# Patient Record
Sex: FEMALE | Race: AMERICAN INDIAN OR ALASKA NATIVE
[De-identification: names, ages, dates, MRNs, and addresses within clinical notes are randomized per-mention and may not be internally consistent; named-entity substitution may affect disease eponyms.]

---

## 2017-07-04 ENCOUNTER — HOSPITAL ENCOUNTER (EMERGENCY)
Dept: HOSPITAL 5 - ED | Age: 42
LOS: 1 days | Discharge: HOME | End: 2017-07-05
Payer: MEDICARE

## 2017-07-04 DIAGNOSIS — K21.9: ICD-10-CM

## 2017-07-04 DIAGNOSIS — F14.10: ICD-10-CM

## 2017-07-04 DIAGNOSIS — Z89.512: ICD-10-CM

## 2017-07-04 DIAGNOSIS — I10: ICD-10-CM

## 2017-07-04 DIAGNOSIS — I82.402: ICD-10-CM

## 2017-07-04 DIAGNOSIS — F17.210: ICD-10-CM

## 2017-07-04 DIAGNOSIS — E11.9: ICD-10-CM

## 2017-07-04 DIAGNOSIS — R07.89: Primary | ICD-10-CM

## 2017-07-04 DIAGNOSIS — M19.90: ICD-10-CM

## 2017-07-04 DIAGNOSIS — E78.00: ICD-10-CM

## 2017-07-04 DIAGNOSIS — E87.6: ICD-10-CM

## 2017-07-04 DIAGNOSIS — Z79.4: ICD-10-CM

## 2017-07-04 DIAGNOSIS — D64.9: ICD-10-CM

## 2017-07-04 LAB
ALBUMIN SERPL-MCNC: 4.1 G/DL (ref 3.9–5)
ALBUMIN/GLOB SERPL: 1.2 %
ALP SERPL-CCNC: 84 UNITS/L (ref 35–129)
ALT SERPL-CCNC: 10 UNITS/L (ref 7–56)
ANION GAP SERPL CALC-SCNC: 21 MMOL/L
BASOPHILS NFR BLD AUTO: 0.9 % (ref 0–1.8)
BILIRUB SERPL-MCNC: < 0.2 MG/DL (ref 0.1–1.2)
BILIRUB UR QL STRIP: (no result)
BLOOD UR QL VISUAL: (no result)
BUN SERPL-MCNC: 6 MG/DL (ref 7–17)
BUN/CREAT SERPL: 8.57 %
CALCIUM SERPL-MCNC: 9.3 MG/DL (ref 8.4–10.2)
CHLORIDE SERPL-SCNC: 95.6 MMOL/L (ref 98–107)
CO2 SERPL-SCNC: 24 MMOL/L (ref 22–30)
EOSINOPHIL NFR BLD AUTO: 1.4 % (ref 0–4.3)
GLUCOSE SERPL-MCNC: 486 MG/DL (ref 65–100)
HCT VFR BLD CALC: 31.6 % (ref 30.3–42.9)
HGB BLD-MCNC: 9.5 GM/DL (ref 10.1–14.3)
KETONES UR STRIP-MCNC: (no result) MG/DL
LEUKOCYTE ESTERASE UR QL STRIP: (no result)
MCH RBC QN AUTO: 20 PG (ref 28–32)
MCHC RBC AUTO-ENTMCNC: 30 % (ref 30–34)
MCV RBC AUTO: 67 FL (ref 79–97)
NITRITE UR QL STRIP: (no result)
PH UR STRIP: 7 [PH] (ref 5–7)
PLATELET # BLD: 228 K/MM3 (ref 140–440)
POTASSIUM SERPL-SCNC: 3.3 MMOL/L (ref 3.6–5)
PROT SERPL-MCNC: 7.4 G/DL (ref 6.3–8.2)
PROT UR STRIP-MCNC: (no result) MG/DL
RBC # BLD AUTO: 4.73 M/MM3 (ref 3.65–5.03)
RBC #/AREA URNS HPF: < 1 /HPF (ref 0–6)
SODIUM SERPL-SCNC: 137 MMOL/L (ref 137–145)
URINE DRUGS OF ABUSE NOTE: (no result)
UROBILINOGEN UR-MCNC: < 2 MG/DL (ref ?–2)
WBC # BLD AUTO: 7.9 K/MM3 (ref 4.5–11)
WBC #/AREA URNS HPF: < 1 /HPF (ref 0–6)

## 2017-07-04 PROCEDURE — 80053 COMPREHEN METABOLIC PANEL: CPT

## 2017-07-04 PROCEDURE — 36415 COLL VENOUS BLD VENIPUNCTURE: CPT

## 2017-07-04 PROCEDURE — 96375 TX/PRO/DX INJ NEW DRUG ADDON: CPT

## 2017-07-04 PROCEDURE — 99285 EMERGENCY DEPT VISIT HI MDM: CPT

## 2017-07-04 PROCEDURE — 85025 COMPLETE CBC W/AUTO DIFF WBC: CPT

## 2017-07-04 PROCEDURE — 93005 ELECTROCARDIOGRAM TRACING: CPT

## 2017-07-04 PROCEDURE — 82550 ASSAY OF CK (CPK): CPT

## 2017-07-04 PROCEDURE — 93010 ELECTROCARDIOGRAM REPORT: CPT

## 2017-07-04 PROCEDURE — 96374 THER/PROPH/DIAG INJ IV PUSH: CPT

## 2017-07-04 PROCEDURE — 71275 CT ANGIOGRAPHY CHEST: CPT

## 2017-07-04 PROCEDURE — 82962 GLUCOSE BLOOD TEST: CPT

## 2017-07-04 PROCEDURE — 81001 URINALYSIS AUTO W/SCOPE: CPT

## 2017-07-04 PROCEDURE — 96361 HYDRATE IV INFUSION ADD-ON: CPT

## 2017-07-04 PROCEDURE — 71020: CPT

## 2017-07-04 PROCEDURE — 81025 URINE PREGNANCY TEST: CPT

## 2017-07-04 PROCEDURE — 84703 CHORIONIC GONADOTROPIN ASSAY: CPT

## 2017-07-04 PROCEDURE — 80307 DRUG TEST PRSMV CHEM ANLYZR: CPT

## 2017-07-04 PROCEDURE — 84484 ASSAY OF TROPONIN QUANT: CPT

## 2017-07-04 NOTE — EMERGENCY DEPARTMENT REPORT
Entered by KRISTAN EVERETT, acting as scribe for CLINT CHANEY NP.


Chief Complaint: Chest Pain


Stated Complaint: CHEST PAIN/LT SIDE PAIN


Time Seen by Provider: 07/04/17 18:29





- HPI


History of Present Illness: 





42 y/o female presents to the ED c/o chest pain that began today. Associated 

symptoms include diaphoresis. Denies nausea and vomiting. Patient states her 

whole left side is aching and throbbing. NKDA.





- ROS


Review of Systems: 





+chest pain


+diaphoresis


-nausea


-vomiting





- Exam


Vital Signs: 


 Vital Signs











  07/04/17





  18:25


 


Temperature 98.7 F


 


Pulse Rate 107 H


 


Respiratory 20





Rate 


 


Blood Pressure 158/103


 


O2 Sat by Pulse 100





Oximetry 











Physical Exam: 





pt looks well, non toxic.


obese


cw not ttp 


mildy tachycardic 


MSE screening note: 


Focused history and physical exam performed.


Due to findings the following was ordered:





labs, ekg, xr 





ED Disposition for MSE


Condition: Stable





This documentation as recorded by the scribe,KRISTAN EVERETT,accurately 

reflects the service I personally performed and the decisions made by SHIV eckert TRACY M, ROOSEVELT.

## 2017-07-05 VITALS — DIASTOLIC BLOOD PRESSURE: 100 MMHG | SYSTOLIC BLOOD PRESSURE: 151 MMHG

## 2017-07-05 NOTE — EMERGENCY DEPARTMENT REPORT
ED Chest Pain HPI





- General


Chief Complaint: Chest Pain


Stated Complaint: CHEST PAIN/LT SIDE PAIN


Time Seen by Provider: 07/04/17 18:29


Source: patient, old records reviewed (recent discharge summary reviewed.  

Patient had a negative stress test here 03/31/2016)


Mode of arrival: Wheelchair


Limitations: No Limitations





- History of Present Illness


Initial Comments: 





41-year-old female with a past medical history as dependent diabetes, 

hypertension, elevated cholesterol, left BKA, and DVT presents to the hospital 

complains of left-sided chest pain and left leg pain.  Patient has chronic left 

leg pain secondary to phantom pain and previous amputation.  Patient states 

today she developed left-sided upper chest pain with some mild diaphoresis.  

Pain is intermittent, sharp, rated 8/10 in intensity, and some increased with 

movement.  No change with deep inspiration, cough, shortness of breath, nausea, 

or vomiting reported.  Patient was recently admitted here June 16-19 for acute 

left lower extremity DVT secondary to Xarelto noncompliance.  Patient states 

she is taking her Xarelto daily since discharge. UDS + for cocaine. Pt states 

last use was June 2





- Related Data


 Home Medications











 Medication  Instructions  Recorded  Confirmed  Last Taken


 


Atorvastatin Calcium 80 mg PO HS 11/10/16 06/16/17 06/15/17


 


Insulin Glargine [Lantus VIAL] 25 units SQ QHS 06/16/17 06/16/17 06/15/17


 


Insulin Lispro [HumaLOG VIAL] 25 units SQ QAM 06/16/17 06/16/17 06/15/17








 Previous Rx's











 Medication  Instructions  Recorded  Last Taken  Type


 


Hydrochlorothiazide [HCTZ] 12.5 mg PO QDAY #30 capsule 06/13/16 06/09/17 Rx


 


Lisinopril [Zestril TAB] 40 mg PO QDAY #30 tablet 06/13/16 06/15/17 Rx


 


Rivaroxaban [Xarelto] 15 mg PO BID #20 tablet 06/19/17 Unknown Rx


 


Rivaroxaban [Xarelto] 20 mg PO QDAY #30 tablet 06/19/17 Unknown Rx


 


HYDROcodone/APAP 5-325 [Norco 1 each PO Q6H PRN #15 tablet 07/05/17 Unknown Rx





5-325 mg TAB]    


 


Potassium Chloride [K-Dur] 20 meq PO QDAY #3 tablet 07/05/17 Unknown Rx











 Allergies











Allergy/AdvReac Type Severity Reaction Status Date / Time


 


No Known Allergies Allergy   Verified 03/11/16 08:25














Heart Score





- HEART Score


History: Slightly suspicious


EKG: Non-specific


Age: < 45


Risk factors: > 3 risk factors or hx of atherosclerotic disease


Troponin: < normal limit


HEART Score: 3





ED Review of Systems


ROS: 


Stated complaint: CHEST PAIN/LT SIDE PAIN


Other details as noted in HPI





Comment: All other systems reviewed and negative


Other: 





Constitutional: No fevers chills 


Eyes: No eye pain visual changes 


ENT: No ear pain or throat pain


Neck: Denies pain


Respiratory: Denies cough wheezing shortness of breath 


Cardiovascular: Denies palpitations, syncope


GI: Denies abdominal pain, nausea, vomiting, diarrhea


: Denies dysuria


Musculoskeletal: Denies back pain


Skin: Denies rash, lesions, erythema


Neurologic: Denies headache, numbness, weakness


Psychiatric: Denies suicidal ideation, hallucinations








ED Past Medical Hx





- Past Medical History


Hx Hypertension: Yes


Hx Congestive Heart Failure: No


Hx Diabetes: Yes (2006)


Hx Deep Vein Thrombosis: Yes


Hx Pulmonary Embolism: Yes


Hx GERD: Yes


Hx Arthritis: Yes


Hx Asthma: No


Hx COPD: No


Hx HIV: No


Additional medical history: high cholesterol





- Surgical History


Hx Cholecystectomy: Yes


Additional Surgical History: L shoulder sgx 2009., DVT removal left leg 3/2016.

  c sec. left BKA





- Social History


Smoking Status: Current Every Day Smoker


Substance Use Type: Alcohol





- Medications


Home Medications: 


 Home Medications











 Medication  Instructions  Recorded  Confirmed  Last Taken  Type


 


Hydrochlorothiazide [HCTZ] 12.5 mg PO QDAY #30 capsule 06/13/16 06/16/17 06/09/ 17 Rx


 


Lisinopril [Zestril TAB] 40 mg PO QDAY #30 tablet 06/13/16 06/16/17 06/15/17 Rx


 


Atorvastatin Calcium 80 mg PO HS 11/10/16 06/16/17 06/15/17 History


 


Insulin Glargine [Lantus VIAL] 25 units SQ QHS 06/16/17 06/16/17 06/15/17 

History


 


Insulin Lispro [HumaLOG VIAL] 25 units SQ QAM 06/16/17 06/16/17 06/15/17 History


 


Rivaroxaban [Xarelto] 15 mg PO BID #20 tablet 06/19/17  Unknown Rx


 


Rivaroxaban [Xarelto] 20 mg PO QDAY #30 tablet 06/19/17  Unknown Rx


 


HYDROcodone/APAP 5-325 [Norco 1 each PO Q6H PRN #15 tablet 07/05/17  Unknown Rx





5-325 mg TAB]     


 


Potassium Chloride [K-Dur] 20 meq PO QDAY #3 tablet 07/05/17  Unknown Rx














ED Physical Exam





- General


Limitations: No Limitations





- Other


Other exam information: 





General: No limitations, patient is alert in no acute distress


Head exam: Atraumatic, normocephalic


Eyes exam: Normal appearance


ENT: Moist mucous membrane, normal oropharynx


Neck exam: Normal inspection, full range of motion


Cardiovascular: Normal rate and rhythm, normal heart sounds


Abdomen: Soft, nondistended, and  nontender, with normal bowel sounds, no 

rebound, or guarding


Extremity: Full range of motion, left BKA without signs of swelling, tenderness

, or leg asymmetry


Back: Normal Inspection, full range of motion, no tenderness


Neurologic: Alert, oriented x3, cranial nerves intact, no motor or sensory 

deficit


Psychiatric: normal affect, normal mood


Skin: Warm, dry, intact





ED Course


 Vital Signs











  07/04/17 07/05/17 07/05/17





  18:25 00:57 01:00


 


Temperature 98.7 F  


 


Pulse Rate 107 H 95 H 88


 


Respiratory 20 14 12





Rate   


 


Blood Pressure 158/103  


 


O2 Sat by Pulse 100  99





Oximetry   














  07/05/17





  01:15


 


Temperature 


 


Pulse Rate 76


 


Respiratory 





Rate 


 


Blood Pressure 


 


O2 Sat by Pulse 





Oximetry 














- Reevaluation(s)


Reevaluation #1: 





07/05/17 01:18


Morphine, normal saline, insulin, Zofran, PO KCL ordered ct angiogram pending








ZACHARY score





- Zachary Score


Age > 65: (0) No


Aspirin use within the Past 7 Days: (0) No


3 or more CAD Risk Factors: (1) Yes


2 or more Angina events in past 24 hrs: (0) No


Known CAD with more than 50% Stenosis: (0) No


Elevated Cardiac Markers: (0) No


ST Deviation Greater than 0.5mm: (0) No


ZACHARY Score: 1





ED Medical Decision Making





- Lab Data


Result diagrams: 


 07/04/17 18:45





 07/04/17 18:45








 Lab Results











  07/04/17 07/04/17 07/04/17 Range/Units





  18:45 18:45 18:45 


 


WBC  7.9    (4.5-11.0)  K/mm3


 


RBC  4.73    (3.65-5.03)  M/mm3


 


Hgb  9.5 L    (10.1-14.3)  gm/dl


 


Hct  31.6    (30.3-42.9)  %


 


MCV  67 L    (79-97)  fl


 


MCH  20 L    (28-32)  pg


 


MCHC  30    (30-34)  %


 


RDW  17.1 H    (13.2-15.2)  %


 


Plt Count  228    (140-440)  K/mm3


 


Lymph % (Auto)  23.8    (13.4-35.0)  %


 


Mono % (Auto)  3.9    (0.0-7.3)  %


 


Eos % (Auto)  1.4    (0.0-4.3)  %


 


Baso % (Auto)  0.9    (0.0-1.8)  %


 


Lymph #  1.9    (1.2-5.4)  K/mm3


 


Mono #  0.3    (0.0-0.8)  K/mm3


 


Eos #  0.1    (0.0-0.4)  K/mm3


 


Baso #  0.1    (0.0-0.1)  K/mm3


 


Seg Neutrophils %  70.0    (40.0-70.0)  %


 


Seg Neutrophils #  5.5    (1.8-7.7)  K/mm3


 


Sodium   137   (137-145)  mmol/L


 


Potassium   3.3 L   (3.6-5.0)  mmol/L


 


Chloride   95.6 L   ()  mmol/L


 


Carbon Dioxide   24   (22-30)  mmol/L


 


Anion Gap   21   mmol/L


 


BUN   6 L   (7-17)  mg/dL


 


Creatinine   0.7   (0.7-1.2)  mg/dL


 


Estimated GFR   > 60   ml/min


 


BUN/Creatinine Ratio   8.57   %


 


Glucose   486 H   ()  mg/dL


 


Calcium   9.3   (8.4-10.2)  mg/dL


 


Total Bilirubin   < 0.20   (0.1-1.2)  mg/dL


 


AST   10   (5-40)  units/L


 


ALT   10   (7-56)  units/L


 


Alkaline Phosphatase   84   ()  units/L


 


Total Creatine Kinase     ()  units/L


 


Troponin T   < 0.010   (0.00-0.029)  ng/mL


 


Total Protein   7.4   (6.3-8.2)  g/dL


 


Albumin   4.1   (3.9-5)  g/dL


 


Albumin/Globulin Ratio   1.2   %


 


HCG, Qual    Negative  (Negative)  


 


Urine Color     (Yellow)  


 


Urine Turbidity     (Clear)  


 


Urine pH     (5.0-7.0)  


 


Ur Specific Gravity     (1.003-1.030)  


 


Urine Protein     (Negative)  mg/dL


 


Urine Glucose (UA)     (Negative)  mg/dL


 


Urine Ketones     (Negative)  mg/dL


 


Urine Blood     (Negative)  


 


Urine Nitrite     (Negative)  


 


Urine Bilirubin     (Negative)  


 


Urine Urobilinogen     (<2.0)  mg/dL


 


Ur Leukocyte Esterase     (Negative)  


 


Urine WBC (Auto)     (0.0-6.0)  /HPF


 


Urine RBC (Auto)     (0.0-6.0)  /HPF


 


U Epithel Cells (Auto)     (0-13.0)  /HPF


 


Urine HCG, Qual     (Negative)  


 


Urine Opiates Screen     


 


Urine Methadone Screen     


 


Ur Barbiturates Screen     


 


Ur Phencyclidine Scrn     


 


Ur Amphetamines Screen     


 


U Benzodiazepines Scrn     


 


Urine Cocaine Screen     


 


U Marijuana (THC) Screen     


 


Drugs of Abuse Note     














  07/04/17 07/04/17 07/04/17 Range/Units





  18:45 19:13 19:13 


 


WBC     (4.5-11.0)  K/mm3


 


RBC     (3.65-5.03)  M/mm3


 


Hgb     (10.1-14.3)  gm/dl


 


Hct     (30.3-42.9)  %


 


MCV     (79-97)  fl


 


MCH     (28-32)  pg


 


MCHC     (30-34)  %


 


RDW     (13.2-15.2)  %


 


Plt Count     (140-440)  K/mm3


 


Lymph % (Auto)     (13.4-35.0)  %


 


Mono % (Auto)     (0.0-7.3)  %


 


Eos % (Auto)     (0.0-4.3)  %


 


Baso % (Auto)     (0.0-1.8)  %


 


Lymph #     (1.2-5.4)  K/mm3


 


Mono #     (0.0-0.8)  K/mm3


 


Eos #     (0.0-0.4)  K/mm3


 


Baso #     (0.0-0.1)  K/mm3


 


Seg Neutrophils %     (40.0-70.0)  %


 


Seg Neutrophils #     (1.8-7.7)  K/mm3


 


Sodium     (137-145)  mmol/L


 


Potassium     (3.6-5.0)  mmol/L


 


Chloride     ()  mmol/L


 


Carbon Dioxide     (22-30)  mmol/L


 


Anion Gap     mmol/L


 


BUN     (7-17)  mg/dL


 


Creatinine     (0.7-1.2)  mg/dL


 


Estimated GFR     ml/min


 


BUN/Creatinine Ratio     %


 


Glucose     ()  mg/dL


 


Calcium     (8.4-10.2)  mg/dL


 


Total Bilirubin     (0.1-1.2)  mg/dL


 


AST     (5-40)  units/L


 


ALT     (7-56)  units/L


 


Alkaline Phosphatase     ()  units/L


 


Total Creatine Kinase  58    ()  units/L


 


Troponin T     (0.00-0.029)  ng/mL


 


Total Protein     (6.3-8.2)  g/dL


 


Albumin     (3.9-5)  g/dL


 


Albumin/Globulin Ratio     %


 


HCG, Qual     (Negative)  


 


Urine Color   Straw   (Yellow)  


 


Urine Turbidity   Clear   (Clear)  


 


Urine pH   7.0   (5.0-7.0)  


 


Ur Specific Gravity   1.028   (1.003-1.030)  


 


Urine Protein   <15 mg/dl   (Negative)  mg/dL


 


Urine Glucose (UA)   >=500   (Negative)  mg/dL


 


Urine Ketones   Neg   (Negative)  mg/dL


 


Urine Blood   Neg   (Negative)  


 


Urine Nitrite   Neg   (Negative)  


 


Urine Bilirubin   Neg   (Negative)  


 


Urine Urobilinogen   < 2.0   (<2.0)  mg/dL


 


Ur Leukocyte Esterase   Neg   (Negative)  


 


Urine WBC (Auto)   < 1.0   (0.0-6.0)  /HPF


 


Urine RBC (Auto)   < 1.0   (0.0-6.0)  /HPF


 


U Epithel Cells (Auto)   < 1.0   (0-13.0)  /HPF


 


Urine HCG, Qual     (Negative)  


 


Urine Opiates Screen    Presumptive negative  


 


Urine Methadone Screen    Presumptive negative  


 


Ur Barbiturates Screen    Presumptive negative  


 


Ur Phencyclidine Scrn    Presumptive negative  


 


Ur Amphetamines Screen    Presumptive negative  


 


U Benzodiazepines Scrn    Presumptive negative  


 


Urine Cocaine Screen    Presumptive positive  


 


U Marijuana (THC) Screen    Presumptive negative  


 


Drugs of Abuse Note    Disclamer  














  07/05/17 07/05/17 Range/Units





  00:52 01:13 


 


WBC    (4.5-11.0)  K/mm3


 


RBC    (3.65-5.03)  M/mm3


 


Hgb    (10.1-14.3)  gm/dl


 


Hct    (30.3-42.9)  %


 


MCV    (79-97)  fl


 


MCH    (28-32)  pg


 


MCHC    (30-34)  %


 


RDW    (13.2-15.2)  %


 


Plt Count    (140-440)  K/mm3


 


Lymph % (Auto)    (13.4-35.0)  %


 


Mono % (Auto)    (0.0-7.3)  %


 


Eos % (Auto)    (0.0-4.3)  %


 


Baso % (Auto)    (0.0-1.8)  %


 


Lymph #    (1.2-5.4)  K/mm3


 


Mono #    (0.0-0.8)  K/mm3


 


Eos #    (0.0-0.4)  K/mm3


 


Baso #    (0.0-0.1)  K/mm3


 


Seg Neutrophils %    (40.0-70.0)  %


 


Seg Neutrophils #    (1.8-7.7)  K/mm3


 


Sodium    (137-145)  mmol/L


 


Potassium    (3.6-5.0)  mmol/L


 


Chloride    ()  mmol/L


 


Carbon Dioxide    (22-30)  mmol/L


 


Anion Gap    mmol/L


 


BUN    (7-17)  mg/dL


 


Creatinine    (0.7-1.2)  mg/dL


 


Estimated GFR    ml/min


 


BUN/Creatinine Ratio    %


 


Glucose    ()  mg/dL


 


Calcium    (8.4-10.2)  mg/dL


 


Total Bilirubin    (0.1-1.2)  mg/dL


 


AST    (5-40)  units/L


 


ALT    (7-56)  units/L


 


Alkaline Phosphatase    ()  units/L


 


Total Creatine Kinase    ()  units/L


 


Troponin T  < 0.010   (0.00-0.029)  ng/mL


 


Total Protein    (6.3-8.2)  g/dL


 


Albumin    (3.9-5)  g/dL


 


Albumin/Globulin Ratio    %


 


HCG, Qual    (Negative)  


 


Urine Color    (Yellow)  


 


Urine Turbidity    (Clear)  


 


Urine pH    (5.0-7.0)  


 


Ur Specific Gravity    (1.003-1.030)  


 


Urine Protein    (Negative)  mg/dL


 


Urine Glucose (UA)    (Negative)  mg/dL


 


Urine Ketones    (Negative)  mg/dL


 


Urine Blood    (Negative)  


 


Urine Nitrite    (Negative)  


 


Urine Bilirubin    (Negative)  


 


Urine Urobilinogen    (<2.0)  mg/dL


 


Ur Leukocyte Esterase    (Negative)  


 


Urine WBC (Auto)    (0.0-6.0)  /HPF


 


Urine RBC (Auto)    (0.0-6.0)  /HPF


 


U Epithel Cells (Auto)    (0-13.0)  /HPF


 


Urine HCG, Qual   Negative  (Negative)  


 


Urine Opiates Screen    


 


Urine Methadone Screen    


 


Ur Barbiturates Screen    


 


Ur Phencyclidine Scrn    


 


Ur Amphetamines Screen    


 


U Benzodiazepines Scrn    


 


Urine Cocaine Screen    


 


U Marijuana (THC) Screen    


 


Drugs of Abuse Note    














- EKG Data


-: EKG Interpreted by Me (sinus rate 97, lvh, lae, anteroseptal infarct)





- EKG Data


When compared to previous EKG there are: no significant change (compared to 6/15

/17)





- Radiology Data


Radiology results: report reviewed, image reviewed (cxr: naf)





ct angio chest: normal





- Medical Decision Making





UDs + cocaine (use 2-3 days ago)


ekg unchanged


trop neg x2 over 6 hrs


pain atypical


CT angio neg








Plan to d/c pt home with pain meds for atypical cp and chronic left leg pain





Pt consulted on the importance stopping cocaine use. 





- Differential Diagnosis


atypical chest pain, pleurisy, MI, unstable angina, PE, disection


Critical Care Time: No


Critical care attestation.: 


If time is entered above; I have spent that time in minutes in the direct care 

of this critically ill patient, excluding procedure time.








ED Disposition


Clinical Impression: 


 Atypical chest pain, Amputation of left lower extremity below knee, Diabetes, 

Anemia, Hypertension, Hypokalemia, Left leg DVT, Left leg pain, Cocaine abuse





Disposition: DC-01 TO HOME OR SELFCARE


Is pt being admited?: No


Does the pt Need Aspirin: No


Condition: Stable


Instructions:  Chest Pain (ED), Diabetes Mellitus Type 2 in Adults (ED), 

Hypertension (ED), Cocaine Abuse (ED), Hypokalemia (ED)


Additional Instructions: 


Take the medication as prescribed. STOP COCAINE USE. Return if symptom worsen.


Prescriptions: 


HYDROcodone/APAP 5-325 [Norco 5-325 mg TAB] 1 each PO Q6H PRN #15 tablet


 PRN Reason: Pain, Moderate (4-6)


Potassium Chloride [K-Dur] 20 meq PO QDAY #3 tablet


Referrals: 


SIMONA DAVIS III, APRN-BC [Primary Care Provider] - 3-5 Days


Time of Disposition: 03:42

## 2017-07-05 NOTE — XRAY REPORT
ROUTINE CHEST, TWO VIEWS:



HISTORY:  chest pain.



The trachea, heart, mediastinal contour, lung fields and bony thorax 

are unremarkable. No significant change since 3/30/16. 



IMPRESSION:

Unremarkable chest x-ray.

## 2017-07-05 NOTE — CAT SCAN REPORT
FINAL REPORT



PROCEDURE:  CT ANGIO CHEST



TECHNIQUE:  Computerized tomographic angiography of the chest was

performed after the IV injection of iodinated nonionic contrast

including image processing. The image data was postprocessed

using 2-dimensional multiplanar reformatted (MPR) and

3-dimensional (MIP and/or volume rendered) techniques. 



HISTORY:  left sharp cp, hx of dvt 



COMPARISON:  03/30/2016



FINDINGS:  

Heart and pericardium: Normal.



Thoracic aorta: Normal.



Pulmonary vasculature: Normal.



Lymph nodes: No enlarged thoracic lymph nodes.



Lungs: Normal.



Pleural space: No effusion, thickening, or pneumothorax.



Musculoskeletal structures: No significant abnormality.



Upper abdominal structures: No significant abnormality.



IMPRESSION:  

Normal Examination

## 2018-07-17 ENCOUNTER — HOSPITAL ENCOUNTER (EMERGENCY)
Dept: HOSPITAL 5 - ED | Age: 43
Discharge: LEFT BEFORE DISCHARGE | End: 2018-07-17
Payer: MEDICARE

## 2018-07-17 VITALS — SYSTOLIC BLOOD PRESSURE: 187 MMHG | DIASTOLIC BLOOD PRESSURE: 111 MMHG

## 2018-07-17 DIAGNOSIS — Z53.21: ICD-10-CM

## 2018-07-17 DIAGNOSIS — R07.9: Primary | ICD-10-CM

## 2018-08-09 ENCOUNTER — HOSPITAL ENCOUNTER (OUTPATIENT)
Dept: HOSPITAL 5 - ED | Age: 43
Setting detail: OBSERVATION
LOS: 3 days | Discharge: HOME HEALTH | End: 2018-08-12
Attending: INTERNAL MEDICINE | Admitting: INTERNAL MEDICINE
Payer: MEDICARE

## 2018-08-09 DIAGNOSIS — R07.89: Primary | ICD-10-CM

## 2018-08-09 DIAGNOSIS — Z82.49: ICD-10-CM

## 2018-08-09 DIAGNOSIS — Z79.01: ICD-10-CM

## 2018-08-09 DIAGNOSIS — E78.5: ICD-10-CM

## 2018-08-09 DIAGNOSIS — F17.200: ICD-10-CM

## 2018-08-09 DIAGNOSIS — E11.65: ICD-10-CM

## 2018-08-09 DIAGNOSIS — Z86.711: ICD-10-CM

## 2018-08-09 DIAGNOSIS — M19.90: ICD-10-CM

## 2018-08-09 DIAGNOSIS — Z89.512: ICD-10-CM

## 2018-08-09 DIAGNOSIS — K21.9: ICD-10-CM

## 2018-08-09 DIAGNOSIS — I73.9: ICD-10-CM

## 2018-08-09 DIAGNOSIS — I10: ICD-10-CM

## 2018-08-09 DIAGNOSIS — E78.00: ICD-10-CM

## 2018-08-09 DIAGNOSIS — Z79.4: ICD-10-CM

## 2018-08-09 DIAGNOSIS — Z86.718: ICD-10-CM

## 2018-08-09 DIAGNOSIS — E87.6: ICD-10-CM

## 2018-08-09 LAB
BASOPHILS # (AUTO): 0 K/MM3 (ref 0–0.1)
BASOPHILS # (AUTO): 0.1 K/MM3 (ref 0–0.1)
BASOPHILS NFR BLD AUTO: 0.8 % (ref 0–1.8)
BASOPHILS NFR BLD AUTO: 1 % (ref 0–1.8)
BUN SERPL-MCNC: 12 MG/DL (ref 7–17)
BUN SERPL-MCNC: 9 MG/DL (ref 7–17)
BUN/CREAT SERPL: 20 %
BUN/CREAT SERPL: 23 %
CALCIUM SERPL-MCNC: 9.7 MG/DL (ref 8.4–10.2)
CALCIUM SERPL-MCNC: 9.7 MG/DL (ref 8.4–10.2)
EOSINOPHIL # BLD AUTO: 0.1 K/MM3 (ref 0–0.4)
EOSINOPHIL # BLD AUTO: 0.1 K/MM3 (ref 0–0.4)
EOSINOPHIL NFR BLD AUTO: 1.2 % (ref 0–4.3)
EOSINOPHIL NFR BLD AUTO: 1.3 % (ref 0–4.3)
HCT VFR BLD CALC: 38.4 % (ref 30.3–42.9)
HCT VFR BLD CALC: 38.7 % (ref 30.3–42.9)
HEMOLYSIS INDEX: 10
HEMOLYSIS INDEX: 5
HGB BLD-MCNC: 12.8 GM/DL (ref 10.1–14.3)
HGB BLD-MCNC: 12.9 GM/DL (ref 10.1–14.3)
LYMPHOCYTES # BLD AUTO: 1.9 K/MM3 (ref 1.2–5.4)
LYMPHOCYTES # BLD AUTO: 2.1 K/MM3 (ref 1.2–5.4)
LYMPHOCYTES NFR BLD AUTO: 29.9 % (ref 13.4–35)
LYMPHOCYTES NFR BLD AUTO: 31.9 % (ref 13.4–35)
MCH RBC QN AUTO: 26 PG (ref 28–32)
MCH RBC QN AUTO: 26 PG (ref 28–32)
MCHC RBC AUTO-ENTMCNC: 33 % (ref 30–34)
MCHC RBC AUTO-ENTMCNC: 33 % (ref 30–34)
MCV RBC AUTO: 77 FL (ref 79–97)
MCV RBC AUTO: 78 FL (ref 79–97)
MONOCYTES # (AUTO): 0.3 K/MM3 (ref 0–0.8)
MONOCYTES # (AUTO): 0.4 K/MM3 (ref 0–0.8)
MONOCYTES % (AUTO): 4.6 % (ref 0–7.3)
MONOCYTES % (AUTO): 6.2 % (ref 0–7.3)
PLATELET # BLD: 210 K/MM3 (ref 140–440)
PLATELET # BLD: 220 K/MM3 (ref 140–440)
RBC # BLD AUTO: 4.95 M/MM3 (ref 3.65–5.03)
RBC # BLD AUTO: 5.01 M/MM3 (ref 3.65–5.03)

## 2018-08-09 PROCEDURE — 83036 HEMOGLOBIN GLYCOSYLATED A1C: CPT

## 2018-08-09 PROCEDURE — 93005 ELECTROCARDIOGRAM TRACING: CPT

## 2018-08-09 PROCEDURE — 96376 TX/PRO/DX INJ SAME DRUG ADON: CPT

## 2018-08-09 PROCEDURE — 78452 HT MUSCLE IMAGE SPECT MULT: CPT

## 2018-08-09 PROCEDURE — 84484 ASSAY OF TROPONIN QUANT: CPT

## 2018-08-09 PROCEDURE — G0378 HOSPITAL OBSERVATION PER HR: HCPCS

## 2018-08-09 PROCEDURE — A9502 TC99M TETROFOSMIN: HCPCS

## 2018-08-09 PROCEDURE — 99285 EMERGENCY DEPT VISIT HI MDM: CPT

## 2018-08-09 PROCEDURE — 85025 COMPLETE CBC W/AUTO DIFF WBC: CPT

## 2018-08-09 PROCEDURE — 96372 THER/PROPH/DIAG INJ SC/IM: CPT

## 2018-08-09 PROCEDURE — 93306 TTE W/DOPPLER COMPLETE: CPT

## 2018-08-09 PROCEDURE — 80048 BASIC METABOLIC PNL TOTAL CA: CPT

## 2018-08-09 PROCEDURE — 36415 COLL VENOUS BLD VENIPUNCTURE: CPT

## 2018-08-09 PROCEDURE — 96375 TX/PRO/DX INJ NEW DRUG ADDON: CPT

## 2018-08-09 PROCEDURE — 81025 URINE PREGNANCY TEST: CPT

## 2018-08-09 PROCEDURE — 96374 THER/PROPH/DIAG INJ IV PUSH: CPT

## 2018-08-09 PROCEDURE — 93010 ELECTROCARDIOGRAM REPORT: CPT

## 2018-08-09 PROCEDURE — 85610 PROTHROMBIN TIME: CPT

## 2018-08-09 PROCEDURE — 82962 GLUCOSE BLOOD TEST: CPT

## 2018-08-09 PROCEDURE — 71045 X-RAY EXAM CHEST 1 VIEW: CPT

## 2018-08-09 PROCEDURE — 93017 CV STRESS TEST TRACING ONLY: CPT

## 2018-08-09 RX ADMIN — FERROUS SULFATE TAB 325 MG (65 MG ELEMENTAL FE) SCH MG: 325 (65 FE) TAB at 13:49

## 2018-08-09 RX ADMIN — FERROUS SULFATE TAB 325 MG (65 MG ELEMENTAL FE) SCH MG: 325 (65 FE) TAB at 21:24

## 2018-08-09 RX ADMIN — MORPHINE SULFATE PRN MG: 2 INJECTION, SOLUTION INTRAMUSCULAR; INTRAVENOUS at 16:46

## 2018-08-09 RX ADMIN — RIVAROXABAN SCH MG: 15 TABLET, FILM COATED ORAL at 13:49

## 2018-08-09 RX ADMIN — MORPHINE SULFATE PRN MG: 2 INJECTION, SOLUTION INTRAMUSCULAR; INTRAVENOUS at 21:31

## 2018-08-09 NOTE — HISTORY AND PHYSICAL REPORT
History of Present Illness


Date of examination: 08/09/18


Date of admission: 





08/09/18








Chief complaint: 





chest pain








History of present illness: 





42-year-old  female with h/o DM type 2 on insulin, hypertension

, diabetes, GERD, DVT, PE on xarelto, hyperlipidemia, peripheral general 

disease and left below-knee amputation presents to the emergency department 

with a complaint of midsternal to left-sided chest pain that does not radiate 

that started about 3 AM this morning.  She states that she woke up due to pain.

  pain was 8 out of 10 in intensity, sharp, denies any associated nausea, 

vomiting, fever, shortness of breath.  In the ER her BG was 560, CE were normal 

and CXR w/o infiltrates. She is getting admited for further evaluation and 

management. 








Review of system:


Constitutional: denies: chills, fever


Eyes: denies: eye pain, eye discharge, vision change


ENT: denies: ear pain, throat pain


Respiratory: denies: cough, shortness of breath, wheezing


Cardiovascular: + chest pain.  denies: palpitations


Gastrointestinal: denies: abdominal pain, nausea, diarrhea


Genitourinary: denies: urgency, dysuria, discharge


Musculoskeletal: denies: back pain, joint swelling, arthralgia


Skin: denies: rash, lesions


Neurological: denies: headache, weakness, paresthesias











Past History


Past Medical History: diabetes, DVT, hypertension, hyperlipidemia, pulmonary 

embolism


Past Surgical History: Other (left BKA)


Social history: lives with family.  denies: smoking, alcohol abuse, 

prescription drug abuse


Family history: diabetes, hypertension





Medications and Allergies


 Allergies











Allergy/AdvReac Type Severity Reaction Status Date / Time


 


No Known Allergies Allergy   Verified 07/17/18 13:02











 Home Medications











 Medication  Instructions  Recorded  Confirmed  Last Taken  Type


 


Lisinopril [Zestril TAB] 40 mg PO QDAY #30 tablet 06/13/16 08/09/18 08/08/18 Rx


 


Insulin Glargine [Lantus VIAL] 40 units SQ QHS 06/16/17 08/09/18 08/08/18 

History


 


Ferrous Sulfate [Iron] 325 mg PO TID 08/09/18 08/09/18 08/08/18 History


 


Insulin Aspart Prot/Aspart(Nf) 30 units SUB-Q BID 08/09/18 08/09/18 08/08/18 

History





[Novolog Mix 70/30]     


 


Pregabalin [Lyrica] 150 mg PO DAILY 08/09/18 08/09/18 08/08/18 History


 


Rivaroxaban [Xarelto] 15 mg PO QDAY 08/09/18 08/09/18 08/08/18 History














Exam





- Constitutional


Vitals: 


 











Temp Pulse Resp BP Pulse Ox


 


 97.7 F   77   16   148/92   99 


 


 08/09/18 07:19  08/09/18 07:19  08/09/18 07:19  08/09/18 07:19  08/09/18 07:19











General appearance: Present: no acute distress, well-nourished





- EENT


Eyes: Present: PERRL


ENT: hearing intact, clear oral mucosa





- Neck


Neck: Present: supple, normal ROM





- Respiratory


Respiratory effort: normal


Respiratory: bilateral: CTA





- Cardiovascular


Heart Sounds: Present: S1 & S2.  Absent: rub, click





- Extremities


Extremities: pulses symmetrical, No edema, abnormal (left bka)


Peripheral Pulses: within normal limits





- Abdominal


General gastrointestinal: Present: soft, non-tender, non-distended, normal 

bowel sounds





- Integumentary


Integumentary: Present: clear, warm, dry





- Musculoskeletal


Musculoskeletal: gait normal, strength equal bilaterally





- Psychiatric


Psychiatric: appropriate mood/affect, intact judgment & insight





- Neurologic


Neurologic: CNII-XII intact, moves all extremities





Results





- Labs


CBC & Chem 7: 


 08/09/18 11:55





 08/10/18 07:14


Labs: 


 Abnormal lab results











  08/09/18 08/09/18 08/09/18 Range/Units





  05:10 05:10 08:22 


 


MCV  78 L    (79-97)  fl


 


MCH  26 L    (28-32)  pg


 


RDW  16.4 H    (13.2-15.2)  %


 


Sodium   135 L   (137-145)  mmol/L


 


Chloride   96.9 L   ()  mmol/L


 


Creatinine   0.6 L   (0.7-1.2)  mg/dL


 


Glucose   560 H*   ()  mg/dL


 


POC Glucose    400 H  ()  














- Imaging and Cardiology


Chest x-ray: report reviewed (no infiltrates)





Assessment and Plan





Chest apin, r/o ACS


DM type 2 with hyperglycemia


HTN, uncontrolled


PVD s/p left leg BKA


h/o DVT/PE on xarelto





Plan


- trend trop, consult cardiology


- monitor CE, EKG


- obtain 2d echo, stress test


- Monitor BG qACHS, place on insulin


- resume xarelto and BP meds

## 2018-08-09 NOTE — EMERGENCY DEPARTMENT REPORT
ED Chest Pain HPI





- General


Chief Complaint: Chest Pain


Stated Complaint: CHEST PAIN


Time Seen by Provider: 08/09/18 10:25


Source: patient


Mode of arrival: Ambulatory


Limitations: No Limitations





- History of Present Illness


Initial Comments: 





42-year-old  female presents to the emergency department with a 

complaint of midsternal to left-sided chest pain that does not radiate that 

started about 3 AM this morning.  She says that laying down makes it worse.  

Currently it is 8 out of 10 in intensity.  She did not take anything for her 

symptoms prior to presentation.  She denies any associated nausea, vomiting, 

fever, shortness of breath.  Her primary care physician is Dr. Alex Jo.  She 

does not have a cardiologist.  She says it is been a few years since she had a 

stress test.  She has a past medical history of hypertension, diabetes, GERD, 

DVT, PE, hyperlipidemia.  The patient takes her Xarelto compliantly.  She also 

has some history of peripheral general disease and left below-knee amputation.


Severity scale (0 -10): 9





- Related Data


 Home Medications











 Medication  Instructions  Recorded  Confirmed  Last Taken


 


Insulin Glargine [Lantus VIAL] 40 units SQ QHS 06/16/17 08/09/18 08/08/18


 


Ferrous Sulfate [Iron] 325 mg PO TID 08/09/18 08/09/18 08/08/18


 


Insulin Aspart Prot/Aspart(Nf) 30 units SUB-Q BID 08/09/18 08/09/18 08/08/18





[Novolog Mix 70/30]    


 


Pregabalin [Lyrica] 150 mg PO DAILY 08/09/18 08/09/18 08/08/18


 


Rivaroxaban [Xarelto] 15 mg PO QDAY 08/09/18 08/09/18 08/08/18








 Previous Rx's











 Medication  Instructions  Recorded  Last Taken  Type


 


Lisinopril [Zestril TAB] 40 mg PO QDAY #30 tablet 06/13/16 08/08/18 Rx











 Allergies











Allergy/AdvReac Type Severity Reaction Status Date / Time


 


No Known Allergies Allergy   Verified 07/17/18 13:02














Heart Score





- HEART Score


History: Moderately suspicious


EKG: Normal


Age: < 45


Risk factors: > 3 risk factors or hx of atherosclerotic disease


Troponin: < normal limit


HEART Score: 3





- Critical Actions


Critical Actions: 0-3 pts:0.9-1.7%risk of adverse cardiac event.Candidate for 

discharge





ED Review of Systems


ROS: 


Stated complaint: CHEST PAIN


Other details as noted in HPI





Comment: All other systems reviewed and negative


Constitutional: denies: chills, fever


Eyes: denies: eye pain, eye discharge, vision change


ENT: denies: ear pain, throat pain


Respiratory: denies: cough, shortness of breath, wheezing


Cardiovascular: chest pain.  denies: palpitations


Gastrointestinal: denies: abdominal pain, nausea, diarrhea


Genitourinary: denies: urgency, dysuria, discharge


Musculoskeletal: denies: back pain, joint swelling, arthralgia


Skin: denies: rash, lesions


Neurological: denies: headache, weakness, paresthesias





ED Past Medical Hx





- Past Medical History


Hx Hypertension: Yes


Hx Congestive Heart Failure: No


Hx Diabetes: Yes


Hx Deep Vein Thrombosis: Yes


Hx Pulmonary Embolism: Yes


Hx GERD: Yes


Hx Arthritis: Yes


Hx Asthma: No


Hx COPD: No


Hx HIV: No


Additional medical history: high cholesterol





- Surgical History


Hx Cholecystectomy: Yes


Additional Surgical History: L shoulder sgx 2009., DVT removal left leg 3/2016.

  c sec. left BKA





- Social History


Smoking Status: Current Every Day Smoker


Substance Use Type: None





- Medications


Home Medications: 


 Home Medications











 Medication  Instructions  Recorded  Confirmed  Last Taken  Type


 


Lisinopril [Zestril TAB] 40 mg PO QDAY #30 tablet 06/13/16 08/09/18 08/08/18 Rx


 


Insulin Glargine [Lantus VIAL] 40 units SQ QHS 06/16/17 08/09/18 08/08/18 

History


 


Ferrous Sulfate [Iron] 325 mg PO TID 08/09/18 08/09/18 08/08/18 History


 


Insulin Aspart Prot/Aspart(Nf) 30 units SUB-Q BID 08/09/18 08/09/18 08/08/18 

History





[Novolog Mix 70/30]     


 


Pregabalin [Lyrica] 150 mg PO DAILY 08/09/18 08/09/18 08/08/18 History


 


Rivaroxaban [Xarelto] 15 mg PO QDAY 08/09/18 08/09/18 08/08/18 History














ED Physical Exam





- General


Limitations: No Limitations





- Other


Other exam information: 





GENERAL: The patient is well-developed well-nourished.


HENT: Normocephalic.  Atraumatic.    Patient has moist mucous membranes.


EYES: Extraocular motions are intact.  Pupils equal reactive to light 

bilaterally.


NECK: Supple.  Trachea is midline.


CHEST/LUNGS: Clear to auscultation.  There is no respiratory distress noted.


HEART/CARDIOVASCULAR: Regular.  There is no tachycardia.  There is no murmur.


ABDOMEN: Abdomen is soft, nontender.  Patient has normal bowel sounds.  There 

is no abdominal distention.


SKIN: Skin is warm and dry.


NEURO: The patient is awake, alert, and oriented.  The patient is cooperative.  

The patient has no focal neurologic deficits.  The patient has normal speech.


MUSCULOSKELETAL: There is no tenderness or deformity.  There is no limitation 

range of motion. LLE BKA.  





ED Course


 Vital Signs











  08/09/18 08/09/18 08/09/18





  04:08 04:33 07:10


 


Temperature 98.3 F 98.3 F 97.7 F


 


Pulse Rate 92 H 20 L 77


 


Respiratory 20 18 16





Rate   


 


Blood Pressure 191/106 191/106 148/92


 


Blood Pressure   





[Left]   


 


O2 Sat by Pulse 99 99 99





Oximetry   














  08/09/18 08/09/18 08/09/18





  07:19 11:15 11:26


 


Temperature 97.7 F 97.6 F 


 


Pulse Rate 77 73 


 


Respiratory 16 18 18





Rate   


 


Blood Pressure 148/92  


 


Blood Pressure  183/99 





[Left]   


 


O2 Sat by Pulse 99 100 





Oximetry   














  08/09/18





  12:26


 


Temperature 


 


Pulse Rate 84


 


Respiratory 18





Rate 


 


Blood Pressure 


 


Blood Pressure 155/99





[Left] 


 


O2 Sat by Pulse 100





Oximetry 














ABNER score





- Abner Score


Age > 65: (0) No


Aspirin use within the Past 7 Days: (0) No


3 or more CAD Risk Factors: (1) Yes


2 or more Angina events in past 24 hrs: (1) Yes


Known CAD with more than 50% Stenosis: (0) No


Elevated Cardiac Markers: (0) No


ST Deviation Greater than 0.5mm: (0) No


ABNER Score: 2





ED Medical Decision Making





- Lab Data


Result diagrams: 


 08/09/18 11:55





 08/09/18 05:10





- EKG Data


-: EKG Interpreted by Me


EKG shows normal: sinus rhythm, axis (left axis deviation), intervals, QRS 

complexes (LVH), ST-T waves


Rate: normal





- EKG Data


When compared to previous EKG there are: previous EKG unavailable


Interpretation: LVH





- Radiology Data


Radiology results: image reviewed


interpreted by me: 





Chest x-ray does not show any acute process.  There are no pleural effusions, 

obvious pneumonia and there is no pneumothorax.





- Medical Decision Making





Patient was sent with some acute left-sided chest pain.  She has a history of 

diabetes but appears uncontrolled as she presented with a blood sugar of about 

560.  No elevation in the anion gap and low suspicion for diabetic 

ketoacidosis.  The patient also presented with very elevated blood pressure 

that did come down to a more reasonable level.  She was given some IV insulin 

for her hyperglycemia.  EKG did not show any signs of ST elevation MI.  First 

troponin negative.  However the patient has a few different risk factors for 

coronary artery disease and has not had a stress test in a few years.  For 

these reasons the patient will be admitted the hospital for further evaluation 

and treatment and was accepted for admission by the hospitalist service.  The 

patient has been seen here on the fast track side and we are working on getting 

her over to the main side of the emergency department for telemetry.





- Differential Diagnosis


MI, costochondritis, GERD, DKA, HHNK


Critical Care Time: No


Critical care attestation.: 


If time is entered above; I have spent that time in minutes in the direct care 

of this critically ill patient, excluding procedure time.








ED Disposition


Clinical Impression: 


 Acute chest pain, Hyperglycemia





Uncontrolled diabetes mellitus


Qualifiers:


 Diabetes mellitus type: type 2 Glycemic state: with hyperglycemia Qualified 

Code(s): E11.65 - Type 2 diabetes mellitus with hyperglycemia





Hypertension


Qualifiers:


 Hypertension type: essential hypertension Qualified Code(s): I10 - Essential (

primary) hypertension





Disposition: DC-09 OP ADMIT IP TO THIS HOSP


Is pt being admited?: Yes


Condition: Fair


Time of Disposition: 13:03

## 2018-08-10 LAB
BUN SERPL-MCNC: 10 MG/DL (ref 7–17)
BUN/CREAT SERPL: 25 %
CALCIUM SERPL-MCNC: 9.1 MG/DL (ref 8.4–10.2)
HEMOLYSIS INDEX: 4
INR PPP: 0.94 (ref 0.87–1.13)

## 2018-08-10 RX ADMIN — FERROUS SULFATE TAB 325 MG (65 MG ELEMENTAL FE) SCH MG: 325 (65 FE) TAB at 23:09

## 2018-08-10 RX ADMIN — MORPHINE SULFATE PRN MG: 2 INJECTION, SOLUTION INTRAMUSCULAR; INTRAVENOUS at 23:08

## 2018-08-10 RX ADMIN — MORPHINE SULFATE PRN MG: 2 INJECTION, SOLUTION INTRAMUSCULAR; INTRAVENOUS at 18:24

## 2018-08-10 RX ADMIN — CARVEDILOL SCH MG: 3.12 TABLET, FILM COATED ORAL at 23:10

## 2018-08-10 RX ADMIN — ASPIRIN SCH MG: 325 TABLET, COATED ORAL at 17:05

## 2018-08-10 RX ADMIN — FERROUS SULFATE TAB 325 MG (65 MG ELEMENTAL FE) SCH: 325 (65 FE) TAB at 17:04

## 2018-08-10 RX ADMIN — PREGABALIN SCH MG: 75 CAPSULE ORAL at 17:05

## 2018-08-10 RX ADMIN — MORPHINE SULFATE PRN MG: 2 INJECTION, SOLUTION INTRAMUSCULAR; INTRAVENOUS at 02:19

## 2018-08-10 RX ADMIN — RIVAROXABAN SCH MG: 15 TABLET, FILM COATED ORAL at 17:08

## 2018-08-10 RX ADMIN — FERROUS SULFATE TAB 325 MG (65 MG ELEMENTAL FE) SCH MG: 325 (65 FE) TAB at 17:04

## 2018-08-10 RX ADMIN — PANTOPRAZOLE SODIUM SCH MG: 40 TABLET, DELAYED RELEASE ORAL at 17:07

## 2018-08-10 RX ADMIN — LISINOPRIL SCH MG: 40 TABLET ORAL at 17:09

## 2018-08-10 NOTE — CONSULTATION
History of Present Illness


Consult date: 08/10/18


Consult reason: chest pain


History of present illness: 





42 year old female with acute onset chest pain retrosternal and non-radiating, 

non-exertional. Troponin negative, ECG showing no ischemic changes. No events 

on tele. Normal MPI and normal LVEF by echo. 





Past History


Past Medical History: diabetes, DVT, GERD, hypertension, hyperlipidemia, 

pulmonary embolism


Past Surgical History: Other (History of left leg AKA)


Social history: smoking





Medications and Allergies


 Allergies











Allergy/AdvReac Type Severity Reaction Status Date / Time


 


No Known Allergies Allergy   Verified 07/17/18 13:02











 Home Medications











 Medication  Instructions  Recorded  Confirmed  Last Taken  Type


 


Lisinopril [Zestril TAB] 40 mg PO QDAY #30 tablet 06/13/16 08/09/18 08/08/18 Rx


 


Insulin Glargine [Lantus VIAL] 40 units SQ QHS 06/16/17 08/09/18 08/08/18 

History


 


Ferrous Sulfate [Iron] 325 mg PO TID 08/09/18 08/09/18 08/08/18 History


 


Insulin Aspart Prot/Aspart(Nf) 30 units SUB-Q BID 08/09/18 08/09/18 08/08/18 

History





[Novolog Mix 70/30]     


 


Pregabalin [Lyrica] 150 mg PO DAILY 08/09/18 08/09/18 08/08/18 History


 


Rivaroxaban [Xarelto] 15 mg PO QDAY 08/09/18 08/09/18 08/08/18 History











Active Meds: 


Active Medications





Aspirin (Ecotrin)  325 mg PO QDAY [facility]


Atorvastatin Calcium (Lipitor)  40 mg PO QHS [facility]


   Last Admin: 08/09/18 21:24 Dose:  40 mg


Ferrous Sulfate (Feosol)  325 mg PO TID [facility]


   Last Admin: 08/09/18 21:24 Dose:  325 mg


Hydralazine HCl (Apresoline)  5 mg IV Q30MIN PRN


   PRN Reason: Hypertension


   Last Admin: 08/10/18 02:19 Dose:  5 mg


Sodium Chloride (Nacl 0.9% 1000 Ml)  1,000 mls @ 75 mls/hr IV AS DIRECT [facility]


Insulin Human Isoph/Insulin Regular (Humulin 70/30)  30 unit SUB-Q BIDDIAB [facility]


   Last Admin: 08/09/18 16:42 Dose:  30 unit


Insulin Human Regular (Humulin R)  0 units SUB-Q ACHS [facility]; Protocol


   Last Admin: 08/09/18 23:00 Dose:  2 units


Lisinopril (Zestril)  40 mg PO QDAY [facility]


Morphine Sulfate (Morphine)  2 mg IV Q5MIN PRN


   PRN Reason: Chest Pain


   Last Admin: 08/10/18 02:19 Dose:  2 mg


Nitroglycerin (Nitrostat)  0.4 mg SL Q5M PRN


   PRN Reason: Chest Pain


Pantoprazole Sodium (Protonix)  40 mg PO QDAY [facility]


Pregabalin (Lyrica)  150 mg PO QDAY [facility]


Rivaroxaban (Xarelto)  15 mg PO QDAY [facility]; Protocol


   Last Admin: 08/09/18 13:49 Dose:  15 mg


Sodium Chloride (Sodium Chloride Flush Syringe 10 Ml)  10 ml IV PRN PRN


   PRN Reason: LINE FLUSH











Review of Systems


All systems: negative





Physical Examination


 Vital Signs











Temp Pulse Resp BP Pulse Ox


 


 98.3 F   92 H  20   191/106   99 


 


 08/09/18 04:08  08/09/18 04:08  08/09/18 04:08  08/09/18 04:08  08/09/18 04:08











General appearance: no acute distress


HEENT: Positive: PERRL


Neck: Positive: neck supple


Cardiac: Positive: Reg Rate and Rhythm


Lungs: Positive: Normal Exam


Abdomen: Positive: Soft





Results





 08/09/18 11:55





 08/10/18 07:14


 Coagulation











  08/10/18 Range/Units





  07:14 


 


PT  13.0  (12.2-14.9)  Sec.


 


INR  0.94  (0.87-1.13)  








 CBC











  08/09/18 Range/Units





  11:55 


 


WBC  6.6  (4.5-11.0)  K/mm3


 


RBC  5.01  (3.65-5.03)  M/mm3


 


Hgb  12.9  (10.1-14.3)  gm/dl


 


Hct  38.7  (30.3-42.9)  %


 


Plt Count  220  (140-440)  K/mm3


 


Lymph #  2.1  (1.2-5.4)  K/mm3


 


Mono #  0.4  (0.0-0.8)  K/mm3


 


Eos #  0.1  (0.0-0.4)  K/mm3


 


Baso #  0.1  (0.0-0.1)  K/mm3








 Comprehensive Metabolic Panel











  08/09/18 08/10/18 Range/Units





  11:55 07:14 


 


Sodium  141  141  (137-145)  mmol/L


 


Potassium  3.7  3.5 L  (3.6-5.0)  mmol/L


 


Chloride  102.7  102.9  ()  mmol/L


 


Carbon Dioxide  22  26  (22-30)  mmol/L


 


BUN  9  10  (7-17)  mg/dL


 


Creatinine  0.4 L  0.4 L  (0.7-1.2)  mg/dL


 


Glucose  256 H  190 H  ()  mg/dL


 


Calcium  9.7  9.1  (8.4-10.2)  mg/dL














EKG interpretations





- Telemetry


EKG Rhythm: Sinus Rhythm





Assessment and Plan





Atypical chest pain


   Negative troponin x 3


   No ischemic ECG changes


   Normal CXR


   Normal LVEF


   Normal MPI


Type II DM


Systemic Hypertension


Hyperlipidemia


History of DVT and PE on xarelto


PVD with history of left leg AKA





Recommendations:





No further cardiac work-up is needed for atypical low risk chest pain and 

cardiac findings

## 2018-08-10 NOTE — TREADMILL REPORT
INDICATION:  Chest pain.



ORDERING PHYSICIAN:  Carrie Bae MD



FINDINGS:  There is no scintigraphic evidence of myocardial ischemia.  The left

ventricle is normal in size.  Left ventricular ejection fraction is measured at

67%.  There is normal wall motion and wall thickening on gated imaging.



CONCLUSION:  Normal perfusion scan.





DD: 08/10/2018 11:05

DT: 08/10/2018 13:36

JOB# [id number]  [id number]

AKEL/NTS

## 2018-08-11 RX ADMIN — ASPIRIN SCH MG: 325 TABLET, COATED ORAL at 12:18

## 2018-08-11 RX ADMIN — CARVEDILOL SCH MG: 3.12 TABLET, FILM COATED ORAL at 12:17

## 2018-08-11 RX ADMIN — CARVEDILOL SCH MG: 3.12 TABLET, FILM COATED ORAL at 23:16

## 2018-08-11 RX ADMIN — FERROUS SULFATE TAB 325 MG (65 MG ELEMENTAL FE) SCH MG: 325 (65 FE) TAB at 08:59

## 2018-08-11 RX ADMIN — LISINOPRIL SCH MG: 40 TABLET ORAL at 12:20

## 2018-08-11 RX ADMIN — PREGABALIN SCH MG: 75 CAPSULE ORAL at 12:19

## 2018-08-11 RX ADMIN — RIVAROXABAN SCH MG: 15 TABLET, FILM COATED ORAL at 12:19

## 2018-08-11 RX ADMIN — PANTOPRAZOLE SODIUM SCH MG: 40 TABLET, DELAYED RELEASE ORAL at 12:19

## 2018-08-11 RX ADMIN — MORPHINE SULFATE PRN MG: 2 INJECTION, SOLUTION INTRAMUSCULAR; INTRAVENOUS at 06:29

## 2018-08-11 RX ADMIN — FERROUS SULFATE TAB 325 MG (65 MG ELEMENTAL FE) SCH MG: 325 (65 FE) TAB at 23:17

## 2018-08-11 RX ADMIN — FERROUS SULFATE TAB 325 MG (65 MG ELEMENTAL FE) SCH MG: 325 (65 FE) TAB at 15:36

## 2018-08-11 NOTE — PROGRESS NOTE
Assessment and Plan





1.  Atypical chest pain.


2.  Left shoulder pain


3.  Essential hypertension


4.  Type 2 diabetes mellitus


5.  History of DVT and pulmonary embolism


6.  Hyperlipidemia





Plan.


Patient is currently stable cardiac-wise low posttest probability for ischemic 

coronary artery disease given negative Lexiscan MPI





Subjective


Date of service: 08/11/18


Interval history: 





No cardiac symptoms.





Objective


 Vital Signs











  Temp Pulse Resp BP BP Pulse Ox


 


 08/11/18 07:54  98.2 F  79  20  149/94   97


 


 08/11/18 03:54  96.1 F L  82  17  108/71   99


 


 08/11/18 00:25  97.5 F L  83  18  145/98   98


 


 08/10/18 22:00   90  18   


 


 08/10/18 19:40  98.4 F  88  18  165/97   93


 


 08/10/18 15:46  98.8 F  124 H  18  113/79   98


 


 08/10/18 13:10  97.8 F  95 H  98 H   164/115  98














- Physical Examination


General: Appears Well, No Apparent Distress


HEENT: Positive: PERRL, Mucus Membranes Moist


Neck: Positive: neck supple.  Negative: JVD/HJR


Cardiac: Positive: Regular Rate, S1/S2, S3, PMI, Laterally Displaced


Lungs: Positive: clear to auscultation, No Wheeze, Rales, Rhonchi


Neuro: Positive: Grossly Intact, No Lateralizing Findings


Abdomen: Positive: Unremarkable, Soft, Active Bowel Sounds


Extremities: Present: Other (left BKA).  Absent: edema





- Telemetry


EKG Rhythm: Sinus Rhythm

## 2018-08-12 VITALS — DIASTOLIC BLOOD PRESSURE: 80 MMHG | SYSTOLIC BLOOD PRESSURE: 121 MMHG

## 2018-08-12 LAB
BUN SERPL-MCNC: 10 MG/DL (ref 7–17)
BUN/CREAT SERPL: 20 %
CALCIUM SERPL-MCNC: 9.8 MG/DL (ref 8.4–10.2)
HEMOLYSIS INDEX: 14

## 2018-08-12 RX ADMIN — LISINOPRIL SCH MG: 40 TABLET ORAL at 09:06

## 2018-08-12 RX ADMIN — ASPIRIN SCH MG: 325 TABLET, COATED ORAL at 09:07

## 2018-08-12 RX ADMIN — CARVEDILOL SCH MG: 3.12 TABLET, FILM COATED ORAL at 09:07

## 2018-08-12 RX ADMIN — PANTOPRAZOLE SODIUM SCH MG: 40 TABLET, DELAYED RELEASE ORAL at 09:07

## 2018-08-12 RX ADMIN — FERROUS SULFATE TAB 325 MG (65 MG ELEMENTAL FE) SCH MG: 325 (65 FE) TAB at 08:45

## 2018-08-12 RX ADMIN — PREGABALIN SCH MG: 75 CAPSULE ORAL at 09:06

## 2018-08-12 RX ADMIN — RIVAROXABAN SCH MG: 15 TABLET, FILM COATED ORAL at 09:07

## 2018-08-12 NOTE — PROGRESS NOTE
Assessment and Plan





Chest apin, ruled out ACS


- likely from GERD


- stress test negative 


- preserved EF on 2d echo





DM type 2 with hyperglycemia


- Monitor BG qACHS, placed on insulin


- will further adjust insulin dose for better BG control





HTN, uncontrolled/Accelerated


- cont coreg, lisinopril, add hydralazine





PVD s/p left leg BKA


- cont aspirin, xarelto and statin





h/o DVT/PE on xarelto





hypokalemia, repleted





Disposition: Likely tomorrow if BP stable

















Subjective


Date of service: 08/11/18


Interval history: 





Pt seen and examined


Pt c/o nausea


Planned to d/c today but SBP remained elevated >200


 





Objective





- Exam


Narrative Exam: 





General appearance: Present: no acute distress, obese





- EENT


Eyes: PERRL, EOM intact


ENT: hearing intact, clear oral mucosa


Ears: bilateral: normal





- Neck


Neck: supple, normal ROM





- Respiratory


Respiratory effort: normal


Respiratory: bilateral: CTA





- Cardiovascular


Rhythm: regular


Heart Sounds: Present: S1 & S2.  Absent: gallop, rub


Extremities: pulses intact, No edema, normal color, Full ROM





- Gastrointestinal


General gastrointestinal: Present: soft, non-tender, non-distended, normal 

bowel sounds





- Integumentary


Integumentary: clear, warm, dry





- Musculoskeletal


Musculoskeletal: 1, strength equal bilaterally





- Neurologic


Neurologic: moves all extremities





- Psychiatric


Psychiatric: memory intact, appropriate mood/affect, intact judgment & insight











- Constitutional


Vitals: 


 Vital Signs - 12hr











  08/11/18 08/11/18 08/11/18





  20:06 21:48 21:57


 


Pulse Rate  74 


 


Pulse Rate [   





Right Radial]   


 


Respiratory   





Rate   


 


Respiratory   17





Rate [Chest]   


 


Blood Pressure 210/117  














  08/11/18 08/11/18 08/11/18





  22:00 23:16 23:19


 


Pulse Rate  84 84


 


Pulse Rate [ 70  





Right Radial]   


 


Respiratory 18  





Rate   


 


Respiratory   





Rate [Chest]   


 


Blood Pressure  158/94 158/94














- Labs


CBC & Chem 7: 


 08/09/18 11:55





 08/10/18 07:14


Labs: 


 Abnormal lab results











  08/11/18 08/11/18 08/12/18 Range/Units





  11:04 22:08 05:50 


 


POC Glucose  160 H  182 H  154 H  ()

## 2018-08-12 NOTE — PROGRESS NOTE
Assessment and Plan





1.  Atypical chest pain resolved


2.  Left shoulder pain


3.  Essential hypertension


4.  Type 2 diabetes mellitus


5.  History of DVT and pulmonary embolism


6.  Hyperlipidemia





Plan.


Patient is currently stable cardiac-wise low posttest probability for ischemic 

coronary artery disease given negative Lexiscan MPI


Discharge planning





Subjective


Date of service: 08/12/18


Interval history: 





No cardiac symptoms.





Objective


 Vital Signs











  Temp Pulse Pulse Resp Resp BP Pulse Ox


 


 08/12/18 09:07   84     121/80 


 


 08/12/18 09:06   84     121/80 


 


 08/12/18 08:54    94 H    


 


 08/12/18 08:50   94 H     


 


 08/12/18 07:57  98.1 F  79   18   121/80  100


 


 08/12/18 04:30  97.5 F L  81   18   105/72  96


 


 08/11/18 23:19   84     158/94 


 


 08/11/18 23:16   84     158/94 


 


 08/11/18 22:00    70  18   


 


 08/11/18 21:57      17  


 


 08/11/18 21:48   74     


 


 08/11/18 20:06       210/117 


 


 08/11/18 19:29  98.7 F    18   210/117 


 


 08/11/18 15:06  97.9 F  77   20   164/94  98


 


 08/11/18 12:20   86     149/94 


 


 08/11/18 12:17   86     149/94 


 


 08/11/18 10:56  97.9 F  77   20   151/94  95














- Physical Examination


General: Appears Well, No Apparent Distress


HEENT: Positive: PERRL, Mucus Membranes Moist


Neck: Positive: neck supple.  Negative: JVD/HJR


Cardiac: Positive: Regular Rate, S1/S2.  Negative: S3, S4


Lungs: Positive: clear to auscultation, No Wheeze, Rales, Rhonchi


Neuro: Positive: Grossly Intact, No Lateralizing Findings


Abdomen: Positive: Unremarkable, Soft, Active Bowel Sounds


Extremities: Present: Other (left BKA).  Absent: edema





- Labs and Meds


 Comprehensive Metabolic Panel











  08/12/18 Range/Units





  08:49 


 


Sodium  140  (137-145)  mmol/L


 


Potassium  3.4 L  (3.6-5.0)  mmol/L


 


Chloride  99.6  ()  mmol/L


 


Carbon Dioxide  26  (22-30)  mmol/L


 


BUN  10  (7-17)  mg/dL


 


Creatinine  0.5 L  (0.7-1.2)  mg/dL


 


Glucose  221 H  ()  mg/dL


 


Calcium  9.8  (8.4-10.2)  mg/dL

## 2018-09-17 ENCOUNTER — HOSPITAL ENCOUNTER (EMERGENCY)
Dept: HOSPITAL 5 - ED | Age: 43
Discharge: HOME | End: 2018-09-17
Payer: MEDICARE

## 2018-09-17 VITALS — DIASTOLIC BLOOD PRESSURE: 80 MMHG | SYSTOLIC BLOOD PRESSURE: 141 MMHG

## 2018-09-17 DIAGNOSIS — M19.90: ICD-10-CM

## 2018-09-17 DIAGNOSIS — E11.65: ICD-10-CM

## 2018-09-17 DIAGNOSIS — F17.200: ICD-10-CM

## 2018-09-17 DIAGNOSIS — R07.89: Primary | ICD-10-CM

## 2018-09-17 DIAGNOSIS — K21.9: ICD-10-CM

## 2018-09-17 DIAGNOSIS — I10: ICD-10-CM

## 2018-09-17 DIAGNOSIS — Z90.49: ICD-10-CM

## 2018-09-17 DIAGNOSIS — M25.512: ICD-10-CM

## 2018-09-17 DIAGNOSIS — Z86.718: ICD-10-CM

## 2018-09-17 LAB
BASOPHILS # (AUTO): 0 K/MM3 (ref 0–0.1)
BASOPHILS NFR BLD AUTO: 0.6 % (ref 0–1.8)
BUN SERPL-MCNC: 14 MG/DL (ref 7–17)
BUN/CREAT SERPL: 23 %
CALCIUM SERPL-MCNC: 9.5 MG/DL (ref 8.4–10.2)
EOSINOPHIL # BLD AUTO: 0.1 K/MM3 (ref 0–0.4)
EOSINOPHIL NFR BLD AUTO: 1.3 % (ref 0–4.3)
HCT VFR BLD CALC: 39.5 % (ref 30.3–42.9)
HEMOLYSIS INDEX: 2
HGB BLD-MCNC: 13.5 GM/DL (ref 10.1–14.3)
LYMPHOCYTES # BLD AUTO: 2.7 K/MM3 (ref 1.2–5.4)
LYMPHOCYTES NFR BLD AUTO: 33.6 % (ref 13.4–35)
MCH RBC QN AUTO: 27 PG (ref 28–32)
MCHC RBC AUTO-ENTMCNC: 34 % (ref 30–34)
MCV RBC AUTO: 78 FL (ref 79–97)
MONOCYTES # (AUTO): 0.5 K/MM3 (ref 0–0.8)
MONOCYTES % (AUTO): 5.7 % (ref 0–7.3)
PLATELET # BLD: 231 K/MM3 (ref 140–440)
RBC # BLD AUTO: 5.05 M/MM3 (ref 3.65–5.03)

## 2018-09-17 PROCEDURE — 93010 ELECTROCARDIOGRAM REPORT: CPT

## 2018-09-17 PROCEDURE — 71045 X-RAY EXAM CHEST 1 VIEW: CPT

## 2018-09-17 PROCEDURE — 36415 COLL VENOUS BLD VENIPUNCTURE: CPT

## 2018-09-17 PROCEDURE — 80048 BASIC METABOLIC PNL TOTAL CA: CPT

## 2018-09-17 PROCEDURE — 93005 ELECTROCARDIOGRAM TRACING: CPT

## 2018-09-17 PROCEDURE — 96361 HYDRATE IV INFUSION ADD-ON: CPT

## 2018-09-17 PROCEDURE — 85025 COMPLETE CBC W/AUTO DIFF WBC: CPT

## 2018-09-17 PROCEDURE — 84484 ASSAY OF TROPONIN QUANT: CPT

## 2018-09-17 PROCEDURE — 96375 TX/PRO/DX INJ NEW DRUG ADDON: CPT

## 2018-09-17 PROCEDURE — 82962 GLUCOSE BLOOD TEST: CPT

## 2018-09-17 PROCEDURE — 96374 THER/PROPH/DIAG INJ IV PUSH: CPT

## 2018-09-17 PROCEDURE — 99284 EMERGENCY DEPT VISIT MOD MDM: CPT

## 2018-09-17 PROCEDURE — 84703 CHORIONIC GONADOTROPIN ASSAY: CPT

## 2018-09-17 NOTE — XRAY REPORT
FINAL REPORT



EXAM:  XR CHEST 1V AP



HISTORY:  shortness of breath 



TECHNIQUE:  A portable semi-upright view the chest was submitted.



FINDINGS:  

The heart size and mediastinum appear normal. The lungs are

clear. There is no evidence of congestion or pleural effusion.

The skeletal structures reveal a levoscoliosis of the mid dorsal

spine.



IMPRESSION:  

No acute cardiopulmonary process.

## 2018-09-17 NOTE — EMERGENCY DEPARTMENT REPORT
ED Chest Pain HPI





- General


Chief Complaint: Chest Pain


Stated Complaint: LEFT SHOULDER,CHEST PAIN


Time Seen by Provider: 09/17/18 06:09


Source: patient


Mode of arrival: Ambulatory


Limitations: No Limitations





- History of Present Illness


Initial Comments: 





43-year-old female with a past medical history of peripheral vascular disease, 

DVT, diabetes, gastroesophageal reflux disease, hypertension presents with the 

complaint of chest pain.  Patient states that she initially had a chest pain 

beginning at 11:30 PM.  Patient's describes the pain as a left shoulder pain.  

Patient states that this was squeezing in nature.  Patient also states that she 

felt a pinch in her right chest.  Patient was recently admitted and discharged 

from this facility after having a stress test which was negative.  Patient 

states she's been compliant with her as a reptile therapy for her pulmonary 

embolism.  Patient complains of a dry cough.  Patient denies diaphoresis but 

complains of numbness and tingling in the left upper extremity.





- Related Data


 Previous Rx's











 Medication  Instructions  Recorded  Last Taken  Type


 


AtorvaSTATin [Lipitor] 40 mg PO QHS #30 tablet 08/11/18 Unknown Rx


 


Carvedilol [Coreg] 3.125 mg PO BID #60 tablet 08/11/18 Unknown Rx


 


Ferrous Sulfate [Iron] 325 mg PO TID #90 tablet 08/11/18 Unknown Rx


 


Lisinopril [Zestril TAB] 40 mg PO QDAY #30 tablet 08/11/18 Unknown Rx


 


Pantoprazole [Protonix TAB] 40 mg PO QDAY #30 tablet 08/11/18 Unknown Rx


 


Pregabalin [Lyrica] 150 mg PO QDAY #30 capsule 08/11/18 Unknown Rx


 


Rivaroxaban [Xarelto] 15 mg PO QDAY #30 tablet 08/11/18 Unknown Rx


 


Insulin Aspart Protam & Aspart 35 unit SQ BID 30 Days  ml 08/12/18 Unknown Rx





[NovoLOG Mix 70-30 Flexpen]    


 


hydrALAZINE [Apresoline TAB] 100 mg PO TID #90 tab 08/12/18 Unknown Rx


 


HYDROcodone/APAP 5-325 [[place] 1 each PO Q6HR PRN #20 tablet 09/17/18 Unknown Rx





5/325]    











 Allergies











Allergy/AdvReac Type Severity Reaction Status Date / Time


 


No Known Allergies Allergy   Verified 07/17/18 13:02














Heart Score





- HEART Score


History: Slightly suspicious


EKG: Non-specific


Age: < 45


Risk factors: > 3 risk factors or hx of atherosclerotic disease


Troponin: < normal limit


HEART Score: 3





- Critical Actions


Critical Actions: 0-3 pts:0.9-1.7%risk of adverse cardiac event.Candidate for 

discharge





ED Review of Systems


ROS: 


Stated complaint: LEFT SHOULDER,CHEST PAIN


Other details as noted in HPI





Constitutional: denies: chills, fever


Eyes: denies: eye pain, eye discharge, vision change


ENT: denies: ear pain, throat pain


Respiratory: SOB at rest


Cardiovascular: chest pain


Endocrine: no symptoms reported


Gastrointestinal: denies: abdominal pain, nausea, diarrhea


Genitourinary: denies: urgency, dysuria, discharge


Musculoskeletal: denies: back pain, joint swelling, arthralgia


Skin: denies: rash, lesions


Neurological: denies: headache, weakness, paresthesias


Hematological/Lymphatic: denies: easy bleeding, easy bruising





ED Past Medical Hx





- Past Medical History


Previous Medical History?: Yes


Hx Hypertension: Yes


Hx Congestive Heart Failure: No


Hx Diabetes: Yes


Hx Deep Vein Thrombosis: Yes


Hx Pulmonary Embolism: Yes


Hx GERD: Yes


Hx Arthritis: Yes


Hx Asthma: No


Hx COPD: No


Hx HIV: No


Additional medical history: high cholesterol





- Surgical History


Past Surgical History?: Yes


Hx Cholecystectomy: Yes


Additional Surgical History: L shoulder sx 2009, DVT removal left leg 3/2016.  

c sec.  left BKA 2016





- Social History


Smoking Status: Current Every Day Smoker


Substance Use Type: None





- Medications


Home Medications: 


 Home Medications











 Medication  Instructions  Recorded  Confirmed  Last Taken  Type


 


AtorvaSTATin [Lipitor] 40 mg PO QHS #30 tablet 08/11/18  Unknown Rx


 


Carvedilol [Coreg] 3.125 mg PO BID #60 tablet 08/11/18  Unknown Rx


 


Ferrous Sulfate [Iron] 325 mg PO TID #90 tablet 08/11/18  Unknown Rx


 


Lisinopril [Zestril TAB] 40 mg PO QDAY #30 tablet 08/11/18  Unknown Rx


 


Pantoprazole [Protonix TAB] 40 mg PO QDAY #30 tablet 08/11/18  Unknown Rx


 


Pregabalin [Lyrica] 150 mg PO QDAY #30 capsule 08/11/18  Unknown Rx


 


Rivaroxaban [Xarelto] 15 mg PO QDAY #30 tablet 08/11/18  Unknown Rx


 


Insulin Aspart Protam & Aspart 35 unit SQ BID 30 Days  ml 08/12/18  Unknown Rx





[NovoLOG Mix 70-30 Flexpen]     


 


hydrALAZINE [Apresoline TAB] 100 mg PO TID #90 tab 08/12/18  Unknown Rx


 


HYDROcodone/APAP 5-325 [[place] 1 each PO Q6HR PRN #20 tablet 09/17/18  Unknown Rx





5/325]     














ED Physical Exam





- General


Limitations: No Limitations, Physical Limitation (patient has amputation of 

Left Lower extremity)


General appearance: alert, in no apparent distress





- Head


Head exam: Present: atraumatic, normocephalic





- Eye


Eye exam: Present: normal appearance





- ENT


ENT exam: Present: mucous membranes moist





- Neck


Neck exam: Present: normal inspection





- Respiratory


Respiratory exam: Present: normal lung sounds bilaterally.  Absent: respiratory 

distress





- Cardiovascular


Cardiovascular Exam: Present: regular rate, normal rhythm.  Absent: systolic 

murmur, diastolic murmur, rubs, gallop





- GI/Abdominal


GI/Abdominal exam: Present: soft, normal bowel sounds





- Rectal


Rectal exam: Present: deferred





- Extremities Exam


Extremities exam: Present: other (patient has the presence of Left BKA)





- Back Exam


Back exam: Present: normal inspection





- Neurological Exam


Neurological exam: Present: alert, oriented X3





- Psychiatric


Psychiatric exam: Present: normal affect, normal mood





- Skin


Skin exam: Present: warm, dry, intact, normal color.  Absent: rash





ED Course


 Vital Signs











  09/17/18 09/17/18 09/17/18





  01:09 08:51 11:50


 


Temperature 98.4 F 98.3 F 


 


Pulse Rate 101 H 80 86


 


Respiratory 18 16 16





Rate   


 


Blood Pressure 129/92  


 


Blood Pressure  154/95 141/80





[Left]   


 


O2 Sat by Pulse 96 100 100





Oximetry   














- Reevaluation(s)


Reevaluation #1: 





09/17/18 11:56


Has had improvement of her pain status post receiving IV morphine therapy.  

Patient also received a bolus of IV fluids with her elevated glucose.





ZACHARY score





- Zachary Score


Age > 65: (0) No (Low suspicion for cardiac disease)


Aspirin use within the Past 7 Days: (0) No


3 or more CAD Risk Factors: (1) Yes


2 or more Angina events in past 24 hrs: (0) No


Known CAD with more than 50% Stenosis: (0) No


Elevated Cardiac Markers: (0) No


ST Deviation Greater than 0.5mm: (0) No


ZACHARY Score: 1





ED Medical Decision Making





- Lab Data


Result diagrams: 


 09/17/18 02:43





 09/17/18 02:43





- EKG Data


-: EKG Interpreted by Me


EKG shows normal: sinus rhythm


Rate: normal





- EKG Data


Interpretation: no acute changes, LVH





- Radiology Data


Radiology results: image reviewed





- Medical Decision Making





Patient is currently on Xarelto therapy.  Patient had a troponin which was 

0.01.  Patient had a subsequent troponin which was normal.  Patient had recent 

study which was normal last month.  She did receive IV morphine and IV Zofran 

therapy while in the emergency department.





Patient received IV fluids and her glucose has down trended.  She appears 

comfortable and will be discharged to follow-up as an outpatient with 

cardiology as an outpatient.





- Differential Diagnosis


DDX: STEMI; NSTEMI; electrolyte abnormality; anemia; pneumonia; pneumotho


Critical Care Time: No


Critical care attestation.: 


If time is entered above; I have spent that time in minutes in the direct care 

of this critically ill patient, excluding procedure time.








ED Disposition


Clinical Impression: 


 Diabetes mellitus type 2, uncontrolled, with complications, Atypical chest pain

, Chest pain





Disposition: DC-01 TO HOME OR SELFCARE


Is pt being admited?: No


Does the pt Need Aspirin: No


Condition: Stable


Instructions:  Chest Pain (ED), Diabetes Mellitus Type 2 in Adults (ED)


Prescriptions: 


HYDROcodone/APAP 5-325 [[place] 5/325] 1 each PO Q6HR PRN #20 tablet


 PRN Reason: Pain


Referrals: 


PRIMARY CARE,MD [Primary Care Provider] - 3-5 Days


EDITA JONAS MD [Staff Physician] - 3-5 Days


Time of Disposition: 11:00


Print Language: ENGLISH

## 2019-02-08 ENCOUNTER — HOSPITAL ENCOUNTER (EMERGENCY)
Dept: HOSPITAL 5 - ED | Age: 44
Discharge: HOME | End: 2019-02-08
Payer: MEDICARE

## 2019-02-08 VITALS — SYSTOLIC BLOOD PRESSURE: 135 MMHG | DIASTOLIC BLOOD PRESSURE: 88 MMHG

## 2019-02-08 DIAGNOSIS — Z86.718: ICD-10-CM

## 2019-02-08 DIAGNOSIS — E11.65: Primary | ICD-10-CM

## 2019-02-08 DIAGNOSIS — I10: ICD-10-CM

## 2019-02-08 DIAGNOSIS — F17.200: ICD-10-CM

## 2019-02-08 DIAGNOSIS — Z90.49: ICD-10-CM

## 2019-02-08 DIAGNOSIS — Z86.711: ICD-10-CM

## 2019-02-08 DIAGNOSIS — E87.6: ICD-10-CM

## 2019-02-08 DIAGNOSIS — M19.90: ICD-10-CM

## 2019-02-08 LAB
ALBUMIN SERPL-MCNC: 3.5 G/DL (ref 3.9–5)
ALT SERPL-CCNC: 6 UNITS/L (ref 7–56)
BASOPHILS # (AUTO): 0.1 K/MM3 (ref 0–0.1)
BASOPHILS NFR BLD AUTO: 0.7 % (ref 0–1.8)
BUN SERPL-MCNC: 8 MG/DL (ref 7–17)
BUN/CREAT SERPL: 16 %
CALCIUM SERPL-MCNC: 9.1 MG/DL (ref 8.4–10.2)
EOSINOPHIL # BLD AUTO: 0.1 K/MM3 (ref 0–0.4)
EOSINOPHIL NFR BLD AUTO: 1.1 % (ref 0–4.3)
HCT VFR BLD CALC: 36.3 % (ref 30.3–42.9)
HEMOLYSIS INDEX: 2
HGB BLD-MCNC: 11.9 GM/DL (ref 10.1–14.3)
LYMPHOCYTES # BLD AUTO: 2.2 K/MM3 (ref 1.2–5.4)
LYMPHOCYTES NFR BLD AUTO: 30.2 % (ref 13.4–35)
MCHC RBC AUTO-ENTMCNC: 33 % (ref 30–34)
MCV RBC AUTO: 79 FL (ref 79–97)
MONOCYTES # (AUTO): 0.4 K/MM3 (ref 0–0.8)
MONOCYTES % (AUTO): 5.5 % (ref 0–7.3)
PLATELET # BLD: 194 K/MM3 (ref 140–440)
RBC # BLD AUTO: 4.57 M/MM3 (ref 3.65–5.03)

## 2019-02-08 PROCEDURE — 85025 COMPLETE CBC W/AUTO DIFF WBC: CPT

## 2019-02-08 PROCEDURE — 36415 COLL VENOUS BLD VENIPUNCTURE: CPT

## 2019-02-08 PROCEDURE — 84703 CHORIONIC GONADOTROPIN ASSAY: CPT

## 2019-02-08 PROCEDURE — 82962 GLUCOSE BLOOD TEST: CPT

## 2019-02-08 PROCEDURE — 80053 COMPREHEN METABOLIC PANEL: CPT

## 2019-02-08 NOTE — EMERGENCY DEPARTMENT REPORT
HPI





- General


Chief Complaint: High BP


Time Seen by Provider: 02/08/19 14:02





- HPI


HPI: 





This is a 43-year-old patient here reported that she was at a pain clinic office

and her blood sugar was at 321 and she took some Humalog at about 8:15 this 

morning.  She says she ran out of her blood pressure medication 30 days ago and 

her blood pressure was also elevated.  Patient blood pressure and blood sugar is

better when she came to the emergency room.  Her blood sugar is 193 and her 

blood pressure is 176/91.  She is asymptomatic.  Patient does have a primary 

care physician.  Denies any headache, fever or chills, nausea or vomiting





ED Past Medical Hx





- Past Medical History


Previous Medical History?: Yes


Hx Hypertension: Yes


Hx Congestive Heart Failure: No


Hx Diabetes: Yes


Hx Deep Vein Thrombosis: Yes


Hx Pulmonary Embolism: Yes


Hx GERD: Yes


Hx Arthritis: Yes


Hx Asthma: No


Hx COPD: No


Hx HIV: No


Additional medical history: high cholesterol





- Surgical History


Past Surgical History?: Yes


Hx Cholecystectomy: Yes


Additional Surgical History: L shoulder sx 2009, DVT removal left leg 3/2016.  c

sec.  left BKA 2016





- Family History


Family history: hypertension





- Social History


Smoking Status: Current Every Day Smoker


Substance Use Type: None





- Medications


Home Medications: 


                                Home Medications











 Medication  Instructions  Recorded  Confirmed  Last Taken  Type


 


AtorvaSTATin [Lipitor] 40 mg PO QHS #30 tablet 08/11/18  Unknown Rx


 


Carvedilol [Coreg] 3.125 mg PO BID #60 tablet 08/11/18  Unknown Rx


 


Ferrous Sulfate [Iron] 325 mg PO TID #90 tablet 08/11/18  Unknown Rx


 


Lisinopril [Zestril TAB] 40 mg PO QDAY #30 tablet 08/11/18  Unknown Rx


 


Pantoprazole [Protonix TAB] 40 mg PO QDAY #30 tablet 08/11/18  Unknown Rx


 


Pregabalin [Lyrica] 150 mg PO QDAY #30 capsule 08/11/18  Unknown Rx


 


Rivaroxaban [Xarelto] 15 mg PO QDAY #30 tablet 08/11/18  Unknown Rx


 


Insulin Aspart Protam & Aspart 35 unit SQ BID 30 Days  ml 08/12/18  Unknown Rx





[NovoLOG Mix 70-30 Flexpen]     


 


hydrALAZINE [Apresoline TAB] 100 mg PO TID #90 tab 08/12/18  Unknown Rx


 


HYDROcodone/APAP 5-325 [[place] 1 each PO Q6HR PRN #20 tablet 09/17/18  Unknown Rx





5/325]     














ED Review of Systems


ROS: 


Stated complaint: HYPERTENSION/HYPERGLYCEMia


Other details as noted in HPI





Constitutional: denies: chills, fever


Respiratory: denies: cough, shortness of breath, wheezing


Cardiovascular: denies: chest pain, palpitations, edema, syncope


Gastrointestinal: denies: abdominal pain, nausea, vomiting, diarrhea


Musculoskeletal: denies: back pain, joint swelling, arthralgia, myalgia


Skin: denies: rash


Neurological: denies: headache, numbness, paresthesias, confusion, abnormal 

gait, vertigo





Physical Exam





- Physical Exam


Vital Signs: 


                                   Vital Signs











  02/08/19 02/08/19





  14:04 14:57


 


Temperature 97.9 F 


 


Pulse Rate 102 H 106 H


 


Respiratory 18 





Rate  


 


Blood Pressure  135/88





[Left]  


 


Blood Pressure 176/98 





[Right]  


 


O2 Sat by Pulse 100 





Oximetry  











General: 





This is a 43-year-old female well-nourished well-developed in no acute distress


Physical Exam: 





Head: Normocephalic, atraumatic. 


Mouth: Oral mucosa moist, tongue is normal, uvula is midline, no PTA or 

drooling, oral airways patent and uvula is 


Lungs: Clear to auscultated bilaterally, no rhonchi wheezes or rales.  No use of

 accessory muscles.  No chest wall tenderness


CV: S1, S2.  Mild tachycardia 102 ,Regular rhythm negative murmur.


Eyes: Bilateral pupils equal and reactive to light, conjunctival injection or 

icterus.  Bilateral EOM intact and normal accommodation.  No nystagmus.  Lids 

are normal.  


Skin: Clean dry and intact, no rash or lesions.


Extremity: No cce. + 2 pulses in all extremities, no neurovascular compromise.No

 laceration, bruises then or contusion noted to extremities.  


Musculoskeletal: Range of motion in all extremities, no joint crepitus, erythema

 or effusion.  


Skin: Clean dry and intact, no rashes no lesions


Mood: Normal mood and behavior





ED Course


                                   Vital Signs











  02/08/19 02/08/19





  14:04 14:57


 


Temperature 97.9 F 


 


Pulse Rate 102 H 106 H


 


Respiratory 18 





Rate  


 


Blood Pressure  135/88





[Left]  


 


Blood Pressure 176/98 





[Right]  


 


O2 Sat by Pulse 100 





Oximetry  








Apical heart rate is at 98 bpm





- Reevaluation(s)


Reevaluation #1: 





02/08/19 15:08


Patient's stable throughout ED course and her blood sugars at 193 and blood 

pressure has normalized











ED Medical Decision Making





- Lab Data


Result diagrams: 


                                 02/08/19 14:19





                                 02/08/19 14:19





                                   Lab Results











  02/08/19 02/08/19 02/08/19 Range/Units





  13:57 14:19 14:19 


 


WBC   7.3   (4.5-11.0)  K/mm3


 


RBC   4.57   (3.65-5.03)  M/mm3


 


Hgb   11.9   (10.1-14.3)  gm/dl


 


Hct   36.3   (30.3-42.9)  %


 


MCV   79   (79-97)  fl


 


MCH   26 L   (28-32)  pg


 


MCHC   33   (30-34)  %


 


RDW   14.3   (13.2-15.2)  %


 


Plt Count   194   (140-440)  K/mm3


 


Lymph % (Auto)   30.2   (13.4-35.0)  %


 


Mono % (Auto)   5.5   (0.0-7.3)  %


 


Eos % (Auto)   1.1   (0.0-4.3)  %


 


Baso % (Auto)   0.7   (0.0-1.8)  %


 


Lymph #   2.2   (1.2-5.4)  K/mm3


 


Mono #   0.4   (0.0-0.8)  K/mm3


 


Eos #   0.1   (0.0-0.4)  K/mm3


 


Baso #   0.1   (0.0-0.1)  K/mm3


 


Seg Neutrophils %   62.5   (40.0-70.0)  %


 


Seg Neutrophils #   4.5   (1.8-7.7)  K/mm3


 


Sodium     (137-145)  mmol/L


 


Potassium     (3.6-5.0)  mmol/L


 


Chloride     ()  mmol/L


 


Carbon Dioxide     (22-30)  mmol/L


 


Anion Gap     mmol/L


 


BUN     (7-17)  mg/dL


 


Creatinine     (0.7-1.2)  mg/dL


 


Estimated GFR     ml/min


 


BUN/Creatinine Ratio     %


 


Glucose     ()  mg/dL


 


POC Glucose  193 H    ()  


 


Calcium     (8.4-10.2)  mg/dL


 


Total Bilirubin     (0.1-1.2)  mg/dL


 


AST     (5-40)  units/L


 


ALT     (7-56)  units/L


 


Alkaline Phosphatase     ()  units/L


 


Total Protein     (6.3-8.2)  g/dL


 


Albumin     (3.9-5)  g/dL


 


Albumin/Globulin Ratio     %


 


HCG, Qual    Negative  (Negative)  














  02/08/19 Range/Units





  14:19 


 


WBC   (4.5-11.0)  K/mm3


 


RBC   (3.65-5.03)  M/mm3


 


Hgb   (10.1-14.3)  gm/dl


 


Hct   (30.3-42.9)  %


 


MCV   (79-97)  fl


 


MCH   (28-32)  pg


 


MCHC   (30-34)  %


 


RDW   (13.2-15.2)  %


 


Plt Count   (140-440)  K/mm3


 


Lymph % (Auto)   (13.4-35.0)  %


 


Mono % (Auto)   (0.0-7.3)  %


 


Eos % (Auto)   (0.0-4.3)  %


 


Baso % (Auto)   (0.0-1.8)  %


 


Lymph #   (1.2-5.4)  K/mm3


 


Mono #   (0.0-0.8)  K/mm3


 


Eos #   (0.0-0.4)  K/mm3


 


Baso #   (0.0-0.1)  K/mm3


 


Seg Neutrophils %   (40.0-70.0)  %


 


Seg Neutrophils #   (1.8-7.7)  K/mm3


 


Sodium  139  (137-145)  mmol/L


 


Potassium  3.5 L  (3.6-5.0)  mmol/L


 


Chloride  102.4  ()  mmol/L


 


Carbon Dioxide  28  (22-30)  mmol/L


 


Anion Gap  12  mmol/L


 


BUN  8  (7-17)  mg/dL


 


Creatinine  0.5 L  (0.7-1.2)  mg/dL


 


Estimated GFR  > 60  ml/min


 


BUN/Creatinine Ratio  16  %


 


Glucose  170 H  ()  mg/dL


 


POC Glucose   ()  


 


Calcium  9.1  (8.4-10.2)  mg/dL


 


Total Bilirubin  < 0.20  (0.1-1.2)  mg/dL


 


AST  10  (5-40)  units/L


 


ALT  6 L  (7-56)  units/L


 


Alkaline Phosphatase  80  ()  units/L


 


Total Protein  6.4  (6.3-8.2)  g/dL


 


Albumin  3.5 L  (3.9-5)  g/dL


 


Albumin/Globulin Ratio  1.2  %


 


HCG, Qual   (Negative)  














- Medical Decision Making





Patient was sent from pain clinic office for elevated blood sugar pressure and 

also elevated blood sugar.  Blood sugar and emergency room is 193 and she said 

it was over 300 and pain clinic and her blood pressure was elevated at pain 

clinic but it stabilized here.  Her potassium was 3.5 and she was given 40 mEq 

of potassium in emergency room.  I told her to eat a couple bananas today.  She 

states she has been doing so but she takes a water pill her blood pressure.  

Patient stable throughout ED course and discharged home to monitor her blood 

pressure and her blood sugar daily and take to her primary care physician with 

her for evaluation and treatment.  She agreed.  Patient was stable she is 

afebrile mild tachycardia and monitor by apical heart rate is 98 bpm.  

Discharged home in stable condition to follow up with her primary care


Critical care attestation.: 


If time is entered above; I have spent that time in minutes in the direct care 

of this critically ill patient, excluding procedure time.








ED Disposition


Clinical Impression: 


 Elevated blood pressure reading with diagnosis of hypertension, Hypokalemia





Hyperglycemia due to type 2 diabetes mellitus


Qualifiers:


 Diabetes mellitus long term insulin use: with long term use Qualified Code(s): 

E11.65 - Type 2 diabetes mellitus with hyperglycemia





Disposition: DC-01 TO HOME OR SELFCARE


Is pt being admited?: No


Does the pt Need Aspirin: No


Condition: Stable


Instructions:  Hypokalemia (ED), Diabetes Mellitus Type 2 in Adults (ED), 

Hypertension (ED), Diabetic Hyperglycemia (ED)


Additional Instructions: 


Please schedule an appointment leave primary care doctor for follow-up visit to 

manage to manage blood pressure and diabetes


Increase water intake to at least 2-3 L of water daily


Follow diabetic diet


If his symptoms return, return to the emergency room


Your potassium was mildly decreased so please eat a banana once daily.


Referrals: 


Your, primary care doctor [Other] - 02/11/19


Forms:  Accompanied Note

## 2019-02-08 NOTE — EMERGENCY DEPARTMENT REPORT
Blank Doc





- Documentation


Documentation: 





43 y o female with pmh of HTN and DM on medication presents to ED cc of high g

lucose levels and high blood pressure.


took 20 units of insulin PTA


takes lisinopril BID


PCP: Sandro Johnson








labs ordered.


reevaluate

## 2019-03-15 ENCOUNTER — HOSPITAL ENCOUNTER (EMERGENCY)
Dept: HOSPITAL 5 - ED | Age: 44
Discharge: HOME | End: 2019-03-15
Payer: MEDICARE

## 2019-03-15 VITALS — DIASTOLIC BLOOD PRESSURE: 95 MMHG | SYSTOLIC BLOOD PRESSURE: 155 MMHG
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
## 2019-03-15 DIAGNOSIS — K21.9: ICD-10-CM

## 2019-03-15 DIAGNOSIS — Z79.899: ICD-10-CM

## 2019-03-15 DIAGNOSIS — M54.42: ICD-10-CM

## 2019-03-15 DIAGNOSIS — E11.9: ICD-10-CM
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
## 2019-03-15 DIAGNOSIS — Z86.718: ICD-10-CM

## 2019-03-15 DIAGNOSIS — Z87.891: ICD-10-CM

## 2019-03-15 DIAGNOSIS — I10: ICD-10-CM

## 2019-03-15 DIAGNOSIS — E78.00: ICD-10-CM
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
## 2019-03-15 DIAGNOSIS — Z90.49: ICD-10-CM

## 2019-03-15 DIAGNOSIS — S33.5XXA: Primary | ICD-10-CM

## 2019-03-15 DIAGNOSIS — Z86.711: ICD-10-CM

## 2019-03-15 DIAGNOSIS — M19.90: ICD-10-CM
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
## 2019-03-15 PROCEDURE — 99283 EMERGENCY DEPT VISIT LOW MDM: CPT

## 2019-03-15 NOTE — EMERGENCY DEPARTMENT REPORT
ED General Adult HPI





- General


Chief complaint: Pain General


Stated complaint: PAIN


Time Seen by Provider: 03/15/19 06:16


Source: patient, EMS


Mode of arrival: Ambulatory


Limitations: No Limitations





- History of Present Illness


Initial comments: 





Patient is a 41-year-old female that presents emergency room with complaints of 

lower back pain.  Patient states that the pain starts in her left lower back and

radiates down her left leg.  Patient states she has a chronic back pain and has 

been diagnosed with degenerative disc disease.  Patient states that she always 

has a mild amount of pain but today her pain was more intense and severe.  

Patient does not remember injuring her back.  Patient denies trauma and fall.  

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 

and is possibility that she twisted her back walking with her prosthesis.  

Patient states that she does not take chronic pain medications.  Patient denies 

other complaints.  Patient denies chest pain shortness of breath.  Patient 

denies fever or chills.  Patient denies saddle numbness and loss control of 

bowel and urine


-: Sudden


Location: back


Radiation: extremity


Severity scale (0 -10): 6


Quality: stabbing


Consistency: constant


Improves with: rest


Worsens with: movement


Associated Symptoms: denies: confusion, chest pain, cough, diaphoresis, 

fever/chills, headaches, loss of appetite, malaise, nausea/vomiting, rash, 

seizure, shortness of breath, syncope, weakness


Treatments Prior to Arrival: none





- Related Data


                                  Previous Rx's











 Medication  Instructions  Recorded  Last Taken  Type


 


AtorvaSTATin [Lipitor] 40 mg PO QHS #30 tablet 08/11/18 Unknown Rx


 


Carvedilol [Coreg] 3.125 mg PO BID #60 tablet 08/11/18 Unknown Rx


 


Ferrous Sulfate [Iron] 325 mg PO TID #90 tablet 08/11/18 Unknown Rx


 


Lisinopril [Zestril TAB] 40 mg PO QDAY #30 tablet 08/11/18 Unknown Rx


 


Pantoprazole [Protonix TAB] 40 mg PO QDAY #30 tablet 08/11/18 Unknown Rx


 


Pregabalin [Lyrica] 150 mg PO QDAY #30 capsule 08/11/18 Unknown Rx


 


Rivaroxaban [Xarelto] 15 mg PO QDAY #30 tablet 08/11/18 Unknown Rx


 


Insulin Aspart Protam & Aspart 35 unit SQ BID 30 Days  ml 08/12/18 Unknown Rx





[NovoLOG Mix 70-30 Flexpen]    


 


hydrALAZINE [Apresoline TAB] 100 mg PO TID #90 tab 08/12/18 Unknown Rx


 


HYDROcodone/APAP 5-325 [[place] 1 each PO Q6HR PRN #20 tablet 09/17/18 Unknown Rx





5/325]    


 


Metaxalone [Skelaxin] 800 mg PO TID PRN #15 tablet 03/15/19 Unknown Rx


 


oxyCODONE /ACETAMINOPHEN [Percocet 1 tab PO Q4HR PRN #10 tab 03/15/19 Unknown Rx





5/325]    











                                    Allergies











Allergy/AdvReac Type Severity Reaction Status Date / Time


 


No Known Allergies Allergy   Verified 02/08/19 14:01














ED Review of Systems


ROS: 


Stated complaint: PAIN


Other details as noted in HPI





Constitutional: denies: chills, fever


Eyes: denies: eye pain, eye discharge, vision change


ENT: denies: ear pain, throat pain


Respiratory: denies: cough, shortness of breath, wheezing


Cardiovascular: denies: chest pain, palpitations


Endocrine: no symptoms reported


Gastrointestinal: denies: abdominal pain, nausea, diarrhea


Genitourinary: denies: urgency, dysuria, discharge


Musculoskeletal: back pain.  denies: joint swelling, arthralgia


Skin: denies: rash, lesions


Neurological: denies: headache, weakness, paresthesias


Psychiatric: denies: anxiety, depression


Hematological/Lymphatic: denies: easy bleeding, easy bruising





ED Past Medical Hx





- Past Medical History


Previous Medical History?: Yes


Hx Hypertension: Yes


Hx Congestive Heart Failure: No


Hx Diabetes: Yes


Hx Deep Vein Thrombosis: Yes


Hx Pulmonary Embolism: Yes


Hx GERD: Yes


Hx Arthritis: Yes


Hx Asthma: No


Hx COPD: No


Hx HIV: No


Additional medical history: high cholesterol





- Surgical History


Past Surgical History?: Yes


Hx Cholecystectomy: Yes


Additional Surgical History: L shoulder sx 2009, DVT removal left leg 3/2016.  c

sec.  left BKA 2016





- Family History


Family history: no significant





- Social History


Smoking Status: Former Smoker


Substance Use Type: None





- Medications


Home Medications: 


                                Home Medications











 Medication  Instructions  Recorded  Confirmed  Last Taken  Type


 


AtorvaSTATin [Lipitor] 40 mg PO QHS #30 tablet 08/11/18  Unknown Rx


 


Carvedilol [Coreg] 3.125 mg PO BID #60 tablet 08/11/18  Unknown Rx


 


Ferrous Sulfate [Iron] 325 mg PO TID #90 tablet 08/11/18  Unknown Rx


 


Lisinopril [Zestril TAB] 40 mg PO QDAY #30 tablet 08/11/18  Unknown Rx


 


Pantoprazole [Protonix TAB] 40 mg PO QDAY #30 tablet 08/11/18  Unknown Rx


 


Pregabalin [Lyrica] 150 mg PO QDAY #30 capsule 08/11/18  Unknown Rx


 


Rivaroxaban [Xarelto] 15 mg PO QDAY #30 tablet 08/11/18  Unknown Rx


 


Insulin Aspart Protam & Aspart 35 unit SQ BID 30 Days  ml 08/12/18  Unknown Rx





[NovoLOG Mix 70-30 Flexpen]     


 


hydrALAZINE [Apresoline TAB] 100 mg PO TID #90 tab 08/12/18  Unknown Rx


 


HYDROcodone/APAP 5-325 [[place] 1 each PO Q6HR PRN #20 tablet 09/17/18  Unknown Rx





5/325]     


 


Metaxalone [Skelaxin] 800 mg PO TID PRN #15 tablet 03/15/19  Unknown Rx


 


oxyCODONE /ACETAMINOPHEN [Percocet 1 tab PO Q4HR PRN #10 tab 03/15/19  Unknown 

Rx





5/325]     














ED Physical Exam





- General


Limitations: No Limitations


General appearance: alert, in no apparent distress





- Head


Head exam: Present: atraumatic, normocephalic





- Eye


Eye exam: Present: normal appearance





- ENT


ENT exam: Present: mucous membranes moist





- Neck


Neck exam: Present: normal inspection





- Respiratory


Respiratory exam: Present: normal lung sounds bilaterally.  Absent: respiratory 

distress





- Cardiovascular


Cardiovascular Exam: Present: regular rate, normal rhythm.  Absent: systolic 

murmur, diastolic murmur, rubs, gallop





- GI/Abdominal


GI/Abdominal exam: Present: soft, normal bowel sounds.  Absent: distended, 

tenderness, guarding





- Rectal


Rectal exam: Present: deferred





- Extremities Exam


Extremities exam: Present: normal inspection (except for left BKA amputation.), 

full ROM, normal capillary refill.  Absent: pedal edema, calf tenderness





- Back Exam


Back exam: Present: normal inspection, other (positive straight leg test)





- Neurological Exam


Neurological exam: Present: alert, oriented X3





- Psychiatric


Psychiatric exam: Present: normal affect, normal mood





- Skin


Skin exam: Present: warm, dry, intact, normal color.  Absent: rash





ED Course


                                   Vital Signs











  03/15/19 03/15/19 03/15/19





  03:42 04:19 05:00


 


Temperature 97.7 F 97.9 F 


 


Pulse Rate 95 H 90 91 H


 


Respiratory 18 17 23





Rate   


 


Blood Pressure 179/106  165/130


 


Blood Pressure  164/108 





[Left]   


 


O2 Sat by Pulse 97 99 98





Oximetry   














  03/15/19 03/15/19





  06:00 07:00


 


Temperature  


 


Pulse Rate 91 H 88


 


Respiratory 25 H 13





Rate  


 


Blood Pressure 158/100 155/95


 


Blood Pressure  





[Left]  


 


O2 Sat by Pulse 100 97





Oximetry  














- Reevaluation(s)


Reevaluation #1: 


Findings are consistent with lumbar strain and acute on chronic back pain.  

Discussed plan of care with patient.  Patient agrees plan of care.  Patient 

states that her pain has improved with the pain medications that were given to 

her.  Patient stable for discharge.  Patient will be discharged home.  Patient 

given discharge instructions.  Patient instructed to follow up with primary care

 and orthopedist.  Patient was understanding of discharge instructions.


03/15/19 07:14











ED Medical Decision Making





- Medical Decision Making





Patient is a 43-year-old female that presents to emergency room for left back 

pain.  Patient has a long history of intermittent back pain and degenerative 

disc disease to the lumbar spine.  Patient was given a short course of pain 

medication as well as instructions to take ibuprofen and Tylenol.  Patient will 

need to follow up with the orthopedist.





- Differential Diagnosis


lumbar strain, sprain.  Degenerative disc disease.  Acute on chronic back p


Critical care attestation.: 


If time is entered above; I have spent that time in minutes in the direct care 

of this critically ill patient, excluding procedure time.








ED Disposition


Clinical Impression: 


Back pain


Qualifiers:


 Back pain location: low back pain Chronicity: acute Back pain laterality: left 

Sciatica presence: with sciatica Sciatica laterality: sciatica of left side 

Qualified Code(s): M54.42 - Lumbago with sciatica, left side





Lumbar sprain


Qualifiers:


 Encounter type: initial encounter Qualified Code(s): S33.5XXA - Sprain of 

ligaments of lumbar spine, initial encounter





Disposition: DC-01 TO HOME OR SELFCARE


Is pt being admited?: No


Does the pt Need Aspirin: No


Condition: Stable


Instructions:  Low Back Strain (ED), Acute Low Back Pain (ED), Chronic Back Pain

 (ED), Back Pain (ED), Core Strengthening Exercises (GEN)


Additional Instructions: 


Patient to follow up with primary care in 2-3 days.  Patient to return to ER if 

condition worsens.  Patient to follow-up with an orthopedist in 2-3 days.  

Patient to take Tylenol or ibuprofen when necessary for pain.  Patient take meds

 as directed.  Patient to increase water.  Patient to rest.


Prescriptions: 


oxyCODONE /ACETAMINOPHEN [Percocet 5/325] 1 tab PO Q4HR PRN #10 tab


 PRN Reason: Pain , Severe (7-10)


Metaxalone [Skelaxin] 800 mg PO TID PRN #15 tablet


 PRN Reason: Spasms


Referrals: 


[facility] [Referring] - 2-3 Days


CLEMENTE TAVAREZ MD [Staff Physician] - 2-3 Days


Time of Disposition: 07:19

## 2019-03-20 ENCOUNTER — HOSPITAL ENCOUNTER (EMERGENCY)
Dept: HOSPITAL 5 - ED | Age: 44
Discharge: HOME | End: 2019-03-20
Payer: MEDICARE

## 2019-03-20 VITALS — SYSTOLIC BLOOD PRESSURE: 187 MMHG | DIASTOLIC BLOOD PRESSURE: 97 MMHG

## 2019-03-20 DIAGNOSIS — Z86.711: ICD-10-CM

## 2019-03-20 DIAGNOSIS — I10: ICD-10-CM

## 2019-03-20 DIAGNOSIS — Z90.49: ICD-10-CM

## 2019-03-20 DIAGNOSIS — Z76.0: ICD-10-CM

## 2019-03-20 DIAGNOSIS — Z86.718: ICD-10-CM

## 2019-03-20 DIAGNOSIS — Z79.899: ICD-10-CM

## 2019-03-20 DIAGNOSIS — E11.9: ICD-10-CM

## 2019-03-20 DIAGNOSIS — F17.200: ICD-10-CM

## 2019-03-20 DIAGNOSIS — M19.90: ICD-10-CM

## 2019-03-20 DIAGNOSIS — R07.89: Primary | ICD-10-CM

## 2019-03-20 DIAGNOSIS — E78.00: ICD-10-CM

## 2019-03-20 DIAGNOSIS — K21.9: ICD-10-CM

## 2019-03-20 DIAGNOSIS — Z79.4: ICD-10-CM

## 2019-03-20 LAB
APTT BLD: 20 SEC. (ref 24.2–36.6)
BASOPHILS # (AUTO): 0.1 K/MM3 (ref 0–0.1)
BASOPHILS NFR BLD AUTO: 1.3 % (ref 0–1.8)
BUN SERPL-MCNC: 8 MG/DL (ref 7–17)
BUN/CREAT SERPL: 20 %
CALCIUM SERPL-MCNC: 9.3 MG/DL (ref 8.4–10.2)
EOSINOPHIL # BLD AUTO: 0.1 K/MM3 (ref 0–0.4)
EOSINOPHIL NFR BLD AUTO: 1.2 % (ref 0–4.3)
HCT VFR BLD CALC: 36.9 % (ref 30.3–42.9)
HEMOLYSIS INDEX: 20
HGB BLD-MCNC: 12.3 GM/DL (ref 10.1–14.3)
INR PPP: 0.87 (ref 0.87–1.13)
LYMPHOCYTES # BLD AUTO: 2.8 K/MM3 (ref 1.2–5.4)
LYMPHOCYTES NFR BLD AUTO: 31 % (ref 13.4–35)
MCHC RBC AUTO-ENTMCNC: 33 % (ref 30–34)
MCV RBC AUTO: 78 FL (ref 79–97)
MONOCYTES # (AUTO): 0.6 K/MM3 (ref 0–0.8)
MONOCYTES % (AUTO): 6.7 % (ref 0–7.3)
PLATELET # BLD: 199 K/MM3 (ref 140–440)
RBC # BLD AUTO: 4.76 M/MM3 (ref 3.65–5.03)

## 2019-03-20 PROCEDURE — 85610 PROTHROMBIN TIME: CPT

## 2019-03-20 PROCEDURE — 36415 COLL VENOUS BLD VENIPUNCTURE: CPT

## 2019-03-20 PROCEDURE — 85379 FIBRIN DEGRADATION QUANT: CPT

## 2019-03-20 PROCEDURE — 93005 ELECTROCARDIOGRAM TRACING: CPT

## 2019-03-20 PROCEDURE — 99284 EMERGENCY DEPT VISIT MOD MDM: CPT

## 2019-03-20 PROCEDURE — 93010 ELECTROCARDIOGRAM REPORT: CPT

## 2019-03-20 PROCEDURE — 80048 BASIC METABOLIC PNL TOTAL CA: CPT

## 2019-03-20 PROCEDURE — 85025 COMPLETE CBC W/AUTO DIFF WBC: CPT

## 2019-03-20 PROCEDURE — 71275 CT ANGIOGRAPHY CHEST: CPT

## 2019-03-20 PROCEDURE — 96375 TX/PRO/DX INJ NEW DRUG ADDON: CPT

## 2019-03-20 PROCEDURE — 84484 ASSAY OF TROPONIN QUANT: CPT

## 2019-03-20 PROCEDURE — 84702 CHORIONIC GONADOTROPIN TEST: CPT

## 2019-03-20 PROCEDURE — 85730 THROMBOPLASTIN TIME PARTIAL: CPT

## 2019-03-20 PROCEDURE — 96374 THER/PROPH/DIAG INJ IV PUSH: CPT

## 2019-03-20 NOTE — CAT SCAN REPORT
PROCEDURE: CT ANGIOGRAM OF THE CHEST FOR PULMONARY EMBOLISM 

 

TECHNIQUE:  Computerized axial tomographic angiography of the chest and pulmonary arteries was perfor
med after the IV injection of iodinated nonionic contrast. The image data was postprocessed using max
imum intensity projection (MIP) and 2-dimensional multiplanar reformatted (MPR) techniques. The exami
nation is specifically tailored to the evaluation of the pulmonary arteries per clinical request.  Au
tomated exposure control, adjustment of mA and/or kV according to patient size, or iterative reconstr
uction dose optimization techniques were utilized. CPT , 36404 

 

HISTORY:  Shortness of breath R06.02, chest pain unspecified R07.9 , 

 

COMPARISONS:  None . 

 

FINDINGS: 

 

Heart and pericardium: No pericardial effusion or thickening. 

Thoracic aorta:  Normal. 

Pulmonary vasculature: Normal. No pulmonary emboli. 

Lymph nodes:  No enlarged thoracic lymph nodes. 

Lungs:  Normal. 

Pleural space:  No effusion, thickening, or pneumothorax. 

Musculoskeletal structures:  No significant abnormality. 

Upper abdominal structures:  No significant abnormality. 

 

IMPRESSION:  No evidence of pulmonary emboli. 

 

This document is electronically signed by Asha Puckett MD., March 20 2019 10:29:47 PM ET

## 2019-03-20 NOTE — EMERGENCY DEPARTMENT REPORT
ED Chest Pain HPI





- General


Chief Complaint: Chest Pain


Stated Complaint: CHEST PAIN


Time Seen by Provider: 03/20/19 18:21


Source: patient, EMS (ems notes not available at time of  chart dictation), RN 

notes reviewed, old records reviewed


Mode of arrival: Stretcher


Limitations: Physical Limitation





- History of Present Illness


Initial Comments: 





This is a 43-year-old female.  The patient has a history of multiple DVTs, 

pulmonary emboli, reportedly does not have IVC filter, also has a history of 

type 2 diabetes, hypertension, GERD, high cholesterol, left above-knee 

amputation, supposed to be on chronic xarelto


Patient ran out of her anticoagulation 3 weeks ago, secondary to insurance 

issues.  She reports that she now has insurance.





Her primary care doctor is Dr. Geovanna Jo.





Her vascular surgeon was Dr. Theron Salinas.





She does not have a local cardiologist.





The patient presents to the emergency room with a complaint of nontraumatic left

sided chest pain.  The chest pain is burning.  It does not radiate to the back, 

arms or neck.  There is no vomiting, diaphoresis, there is no shortness of 

breath.  No recent aspirin use.  No recent cocaine use.  The patient denies 

headache, neck pain, abdominal pain, hematemesis, bright red blood per rectum.  

Patient admitted to this hospital August 2018, had a cardiac workup, including a

nuclear stress test, which was deemed to be unremarkable and normal for the 

patient's age.





Cardiology team evaluated the patient, felt that her chest pain was likely 

secondary to GERD, and she had a preserved ejection fraction on 2-D 

echocardiogram.





The patient reports that she now has [employer] insurance, and indicates she will be 

able to afford prescriptions at this time.  She reports being on 15 mg daily of 

xarelto  prophylaxis.





MD Complaint: chest pain


-: Gradual


Onset: during rest


Pain Location: left chest


Pain Radiation: none


Severity: mild


Severity scale (0 -10): 9


Quality: aching


Consistency: constant


Improves With: nothing


Worsens With: nothing


Aspirin use within the Past 7 Days: (0) No





- Related Data


On Oral Contraceptives: No


                                  Previous Rx's











 Medication  Instructions  Recorded  Last Taken  Type


 


AtorvaSTATin [Lipitor] 40 mg PO QHS #30 tablet 08/11/18 Unknown Rx


 


Carvedilol [Coreg] 3.125 mg PO BID #60 tablet 08/11/18 Unknown Rx


 


Ferrous Sulfate [Iron] 325 mg PO TID #90 tablet 08/11/18 Unknown Rx


 


Lisinopril [Zestril TAB] 40 mg PO QDAY #30 tablet 08/11/18 Unknown Rx


 


Pantoprazole [Protonix TAB] 40 mg PO QDAY #30 tablet 08/11/18 Unknown Rx


 


Pregabalin [Lyrica] 150 mg PO QDAY #30 capsule 08/11/18 Unknown Rx


 


Insulin Aspart Protam & Aspart 35 unit SQ BID 30 Days  ml 08/12/18 Unknown Rx





[NovoLOG Mix 70-30 Flexpen]    


 


hydrALAZINE [Apresoline TAB] 100 mg PO TID #90 tab 08/12/18 Unknown Rx


 


HYDROcodone/APAP 5-325 [[place] 1 each PO Q6HR PRN #20 tablet 09/17/18 Unknown Rx





5/325]    


 


Metaxalone [Skelaxin] 800 mg PO TID PRN #15 tablet 03/15/19 Unknown Rx


 


oxyCODONE /ACETAMINOPHEN [Percocet 1 tab PO Q4HR PRN #10 tab 03/15/19 Unknown Rx





5/325]    


 


Famotidine [Pepcid] 20 mg PO QDAY #30 tablet 03/20/19 Unknown Rx


 


Rivaroxaban [Xarelto] 15 mg PO QDAY #30 tablet 03/20/19 Unknown Rx











                                    Allergies











Allergy/AdvReac Type Severity Reaction Status Date / Time


 


No Known Allergies Allergy   Verified 02/08/19 14:01














Heart Score





- HEART Score


History: Slightly suspicious


EKG: Non-specific


Age: 45-65


Risk factors: > 3 risk factors or hx of atherosclerotic disease


Troponin: < normal limit


HEART Score: 4





- Critical Actions


Critical Actions: 4-6 pts:12-16.6% risk of adverse cardiac event. Should be 

admitted





ED Review of Systems


ROS: 


Stated complaint: CHEST PAIN


Other details as noted in HPI





Constitutional: denies: fever, malaise


Eyes: denies: vision change


ENT: denies: epistaxis


Respiratory: denies: cough


Cardiovascular: chest pain


Gastrointestinal: denies: abdominal pain, nausea, vomiting


Genitourinary: denies: dysuria


Musculoskeletal: denies: back pain


Skin: denies: lesions


Neurological: denies: weakness


Psychiatric: denies: anxiety





ED Past Medical Hx





- Past Medical History


Previous Medical History?: Yes


Hx Hypertension: Yes


Hx Congestive Heart Failure: No


Hx Diabetes: Yes


Hx Deep Vein Thrombosis: Yes


Hx Pulmonary Embolism: Yes (DVT; Takes Xarelto)


Hx GERD: Yes


Hx Arthritis: Yes


Hx Asthma: No


Hx COPD: No


Hx HIV: No


Additional medical history: high cholesterol





- Surgical History


Past Surgical History?: Yes


Hx Cholecystectomy: Yes


Additional Surgical History: L shoulder sx 2009, DVT removal left leg 3/2016.  c

 sec.  left BKA 2016





- Social History


Smoking Status: Current Some Day Smoker


Substance Use Type: None





- Medications


Home Medications: 


                                Home Medications











 Medication  Instructions  Recorded  Confirmed  Last Taken  Type


 


AtorvaSTATin [Lipitor] 40 mg PO QHS #30 tablet 08/11/18  Unknown Rx


 


Carvedilol [Coreg] 3.125 mg PO BID #60 tablet 08/11/18  Unknown Rx


 


Ferrous Sulfate [Iron] 325 mg PO TID #90 tablet 08/11/18  Unknown Rx


 


Lisinopril [Zestril TAB] 40 mg PO QDAY #30 tablet 08/11/18  Unknown Rx


 


Pantoprazole [Protonix TAB] 40 mg PO QDAY #30 tablet 08/11/18  Unknown Rx


 


Pregabalin [Lyrica] 150 mg PO QDAY #30 capsule 08/11/18  Unknown Rx


 


Insulin Aspart Protam & Aspart 35 unit SQ BID 30 Days  ml 08/12/18  Unknown Rx





[NovoLOG Mix 70-30 Flexpen]     


 


hydrALAZINE [Apresoline TAB] 100 mg PO TID #90 tab 08/12/18  Unknown Rx


 


HYDROcodone/APAP 5-325 [[place] 1 each PO Q6HR PRN #20 tablet 09/17/18  Unknown Rx





5/325]     


 


Metaxalone [Skelaxin] 800 mg PO TID PRN #15 tablet 03/15/19  Unknown Rx


 


oxyCODONE /ACETAMINOPHEN [Percocet 1 tab PO Q4HR PRN #10 tab 03/15/19  Unknown 

Rx





5/325]     


 


Famotidine [Pepcid] 20 mg PO QDAY #30 tablet 03/20/19  Unknown Rx


 


Rivaroxaban [Xarelto] 15 mg PO QDAY #30 tablet 03/20/19  Unknown Rx














ED Physical Exam





- General


Limitations: Physical Limitation


General appearance: alert, in no apparent distress





- Head


Head exam: Present: atraumatic, normocephalic





- Eye


Eye exam: Present: normal appearance, EOMI.  Absent: nystagmus





- ENT


ENT exam: Present: normal exam, normal orophraynx, mucous membranes moist, 

normal external ear exam





- Neck


Neck exam: Present: normal inspection, full ROM.  Absent: tenderness, 

meningismus





- Respiratory


Respiratory exam: Present: normal lung sounds bilaterally.  Absent: respiratory 

distress





- Cardiovascular


Cardiovascular Exam: Present: regular rate, normal rhythm, normal heart sounds. 

 Absent: bradycardia, tachycardia, irregular rhythm, systolic murmur, diastolic 

murmur, rubs, gallop





- GI/Abdominal


GI/Abdominal exam: Present: soft.  Absent: distended, tenderness, guarding, 

rebound, rigid, pulsatile mass





- Extremities Exam


Extremities exam: Present: normal inspection, full ROM, other (2+ pulses noted 

in the bilateral upper extremities and right lower extremity.  Left lower extrem

ity status post above -knee amputation.  No palpable cord.  Negative Homans 

sign.).  Absent: pedal edema, joint swelling, calf tenderness





- Back Exam


Back exam: Present: normal inspection, full ROM.  Absent: tenderness, CVA 

tenderness (R), paraspinal tenderness, vertebral tenderness





- Neurological Exam


Neurological exam: Present: alert, oriented X3, other (Extraocular movements 

intact.  Tongue midline.  No facial droop.  Facial sensation intact to light 

touch in the V1, V2, V3 distribution bilaterally.  5 and 5 strength in 4 

extremities..  Sensation is intact to light touch in 4 extremities.).  Absent: 

motor sensory deficit





- Psychiatric


Psychiatric exam: Present: normal affect, normal mood





- Skin


Skin exam: Present: warm, dry, intact, normal color.  Absent: rash





ED Course


                                   Vital Signs











  03/20/19 03/20/19 03/20/19





  18:06 18:08 18:31


 


Temperature  98.1 F 


 


Pulse Rate 86 88 90


 


Respiratory 13 16 13





Rate   


 


Blood Pressure  166/101 166/101


 


Blood Pressure   





[Right]   


 


O2 Sat by Pulse 100 98 99





Oximetry   














  03/20/19 03/20/19 03/20/19





  20:30 21:31 21:43


 


Temperature   


 


Pulse Rate 93 H 96 H 97 H


 


Respiratory 15 12 





Rate   


 


Blood Pressure 170/107 192/105 192/105


 


Blood Pressure   





[Right]   


 


O2 Sat by Pulse  100 





Oximetry   














  03/20/19





  23:56


 


Temperature 


 


Pulse Rate 83


 


Respiratory 18





Rate 


 


Blood Pressure 


 


Blood Pressure 187/97





[Right] 


 


O2 Sat by Pulse 100





Oximetry 














- Reevaluation(s)


Reevaluation #1: 





03/20/19 20:12


Differential diagnosis, including not limited to: GERD, gastritis, hiatal 

hernia, pulmonary embolus, acute coronary syndrome





Assessment and plan: 43-year-old female who supposed to be on lifelong 

anticoagulation, now has insurance, with left-sided chest pain.  The patient is 

afebrile with reassuring vital signs, not tachycardic, not hypoxic, and is 

currently pain-free.





Troponin negative 1, had a negative nuclear stress test within the past 12 

months, cleared by cardiology in October 2018, d-dimer elevated, and CT scan of 

the chest is pending at this time.





Heart score of 4 is elevated and appreciated, however, the patient did have a 

risk stratification, her EKG today appears to be unchanged from prior EKGs, and 

she is resting comfortably at this time.





The local cardiology groups are typically quite amenable to seeing patients 

closely as an outpatient.  We will obtain CT scan of the chest to exclude 

pulmonary embolus, obtain additional troponin, additional EKG.





Assuming repeat laboratory studies, repeat troponin, CT scan of the chest 

negative for acute disease, we will refill the patient's anticoagulation, and 

discharge her with supportive pain medication, with instructions to follow up 

with outpatient cardiology, outpatient vascular surgery and outpatient primary 

care.














care transferred to Dr CECILIA Ge to follow up on cta chest and repeat troponin, 

repeat ekg





ABNER score





- Abner Score


Age > 65: (0) No (Low suspicion for cardiac disease)


Aspirin use within the Past 7 Days: (0) No


3 or more CAD Risk Factors: (1) Yes


2 or more Angina events in past 24 hrs: (0) No


Known CAD with more than 50% Stenosis: (0) No


Elevated Cardiac Markers: (0) No


ST Deviation Greater than 0.5mm: (0) No


ABNER Score: 1





ED Medical Decision Making





- Lab Data


Result diagrams: 


                                 03/20/19 18:48





                                 03/20/19 18:48








                                   Vital Signs











  03/20/19





  18:08


 


Temperature 98.1 F


 


Pulse Rate 88


 


Respiratory 16





Rate 


 


Blood Pressure 166/101


 


O2 Sat by Pulse 98





Oximetry 











                                   Lab Results











  03/20/19 03/20/19 03/20/19 Range/Units





  18:48 18:48 18:48 


 


WBC  8.9    (4.5-11.0)  K/mm3


 


RBC  4.76    (3.65-5.03)  M/mm3


 


Hgb  12.3    (10.1-14.3)  gm/dl


 


Hct  36.9    (30.3-42.9)  %


 


MCV  78 L    (79-97)  fl


 


MCH  26 L    (28-32)  pg


 


MCHC  33    (30-34)  %


 


RDW  15.6 H    (13.2-15.2)  %


 


Plt Count  199    (140-440)  K/mm3


 


Lymph % (Auto)  31.0    (13.4-35.0)  %


 


Mono % (Auto)  6.7    (0.0-7.3)  %


 


Eos % (Auto)  1.2    (0.0-4.3)  %


 


Baso % (Auto)  1.3    (0.0-1.8)  %


 


Lymph #  2.8    (1.2-5.4)  K/mm3


 


Mono #  0.6    (0.0-0.8)  K/mm3


 


Eos #  0.1    (0.0-0.4)  K/mm3


 


Baso #  0.1    (0.0-0.1)  K/mm3


 


Seg Neutrophils %  59.8    (40.0-70.0)  %


 


Seg Neutrophils #  5.3    (1.8-7.7)  K/mm3


 


PT    12.3  (12.2-14.9)  Sec.


 


INR    0.87  (0.87-1.13)  


 


APTT    20.0 L  (24.2-36.6)  Sec.


 


D-Dimer    489.55 H  (0-234)  ng/mlDDU


 


Sodium   138   (137-145)  mmol/L


 


Potassium   3.7   (3.6-5.0)  mmol/L


 


Chloride   101.1   ()  mmol/L


 


Carbon Dioxide   25   (22-30)  mmol/L


 


Anion Gap   16   mmol/L


 


BUN   8   (7-17)  mg/dL


 


Creatinine   0.4 L   (0.7-1.2)  mg/dL


 


Estimated GFR   > 60   ml/min


 


BUN/Creatinine Ratio   20   %


 


Glucose   273 H   ()  mg/dL


 


Calcium   9.3   (8.4-10.2)  mg/dL


 


Troponin T   < 0.010   (0.00-0.029)  ng/mL


 


HCG, Quant     (0-4)  mIU/mL














  03/20/19 Range/Units





  18:48 


 


WBC   (4.5-11.0)  K/mm3


 


RBC   (3.65-5.03)  M/mm3


 


Hgb   (10.1-14.3)  gm/dl


 


Hct   (30.3-42.9)  %


 


MCV   (79-97)  fl


 


MCH   (28-32)  pg


 


MCHC   (30-34)  %


 


RDW   (13.2-15.2)  %


 


Plt Count   (140-440)  K/mm3


 


Lymph % (Auto)   (13.4-35.0)  %


 


Mono % (Auto)   (0.0-7.3)  %


 


Eos % (Auto)   (0.0-4.3)  %


 


Baso % (Auto)   (0.0-1.8)  %


 


Lymph #   (1.2-5.4)  K/mm3


 


Mono #   (0.0-0.8)  K/mm3


 


Eos #   (0.0-0.4)  K/mm3


 


Baso #   (0.0-0.1)  K/mm3


 


Seg Neutrophils %   (40.0-70.0)  %


 


Seg Neutrophils #   (1.8-7.7)  K/mm3


 


PT   (12.2-14.9)  Sec.


 


INR   (0.87-1.13)  


 


APTT   (24.2-36.6)  Sec.


 


D-Dimer   (0-234)  ng/mlDDU


 


Sodium   (137-145)  mmol/L


 


Potassium   (3.6-5.0)  mmol/L


 


Chloride   ()  mmol/L


 


Carbon Dioxide   (22-30)  mmol/L


 


Anion Gap   mmol/L


 


BUN   (7-17)  mg/dL


 


Creatinine   (0.7-1.2)  mg/dL


 


Estimated GFR   ml/min


 


BUN/Creatinine Ratio   %


 


Glucose   ()  mg/dL


 


Calcium   (8.4-10.2)  mg/dL


 


Troponin T   (0.00-0.029)  ng/mL


 


HCG, Quant  1.86  (0-4)  mIU/mL














- EKG Data


-: EKG Interpreted by Me


EKG shows normal: sinus rhythm


Rate: normal





- EKG Data





03/20/19 20:11


Sinus rhythm, 87 bpm, left axis deviation, left anterior fascicular block, poor 

R-wave progression, high left ventricular voltage, abnormal EKG, not consistent 

with ST elevation myocardial infarction, EKG today appears to be unchanged from 

prior EKGs from September 2018, October 2018, June, 2017.








Critical care attestation.: 


If time is entered above; I have spent that time in minutes in the direct care 

of this critically ill patient, excluding procedure time.








ED Disposition


Clinical Impression: 


 Medication refill, Chest pain





Disposition: DC-01 TO HOME OR SELFCARE


Is pt being admited?: No


Does the pt Need Aspirin: No


Condition: Stable


Instructions:  Chest Pain (ED)


Additional Instructions: 


Take the medications as needed/directed.  Do not take metformin for the next 48 

hours.





Follow-up with her cardiologist within the next 3-4 days.





follow  up with a vascular surgeon within the next 2 weeks.





Return to the emergency room right away with new pain, worsened pain, migration 

of pain, vomiting, change in mental status, confusion, new, worsening or 

different symptoms.


Prescriptions: 


Famotidine [Pepcid] 20 mg PO QDAY #30 tablet


Rivaroxaban [Xarelto] 15 mg PO QDAY #30 tablet


Referrals: 


WARD BURTON MD [Staff Physician] - 3-5 Days


THERON SALINAS MD [Staff Physician] - 7-10 days


GEOVANNA JO MD [Primary Care Provider] - as needed

## 2019-03-20 NOTE — EVENT NOTE
Date: 03/20/19


Patient was reassessed and is currently having no chest pain.  Patient has 2 

sets of troponin which are negative.  Patient has a CT PE which also was 

negative.  Patient be discharged.

## 2019-12-20 ENCOUNTER — HOSPITAL ENCOUNTER (EMERGENCY)
Dept: HOSPITAL 5 - ED | Age: 44
Discharge: LEFT BEFORE DISCHARGE | End: 2019-12-20
Payer: MEDICARE

## 2019-12-20 DIAGNOSIS — M79.605: Primary | ICD-10-CM

## 2019-12-20 DIAGNOSIS — Z53.21: ICD-10-CM

## 2020-02-23 ENCOUNTER — HOSPITAL ENCOUNTER (EMERGENCY)
Dept: HOSPITAL 5 - ED | Age: 45
LOS: 1 days | Discharge: HOME | End: 2020-02-24
Payer: MEDICARE

## 2020-02-23 DIAGNOSIS — Z98.890: ICD-10-CM

## 2020-02-23 DIAGNOSIS — Z79.4: ICD-10-CM

## 2020-02-23 DIAGNOSIS — M79.604: ICD-10-CM

## 2020-02-23 DIAGNOSIS — K21.9: ICD-10-CM

## 2020-02-23 DIAGNOSIS — I10: ICD-10-CM

## 2020-02-23 DIAGNOSIS — M19.90: ICD-10-CM

## 2020-02-23 DIAGNOSIS — E11.65: ICD-10-CM

## 2020-02-23 DIAGNOSIS — F17.200: ICD-10-CM

## 2020-02-23 DIAGNOSIS — Z90.49: ICD-10-CM

## 2020-02-23 DIAGNOSIS — R07.89: Primary | ICD-10-CM

## 2020-02-23 DIAGNOSIS — Z79.899: ICD-10-CM

## 2020-02-23 LAB
HCT VFR BLD CALC: 39.9 % (ref 30.3–42.9)
HGB BLD-MCNC: 14 GM/DL (ref 10.1–14.3)
INR PPP: 0.86 (ref 0.87–1.13)
MCHC RBC AUTO-ENTMCNC: 35 % (ref 30–34)
MCV RBC AUTO: 83 FL (ref 79–97)
PLATELET # BLD: 193 K/MM3 (ref 140–440)
RBC # BLD AUTO: 4.8 M/MM3 (ref 3.65–5.03)

## 2020-02-23 PROCEDURE — 82962 GLUCOSE BLOOD TEST: CPT

## 2020-02-23 PROCEDURE — 96374 THER/PROPH/DIAG INJ IV PUSH: CPT

## 2020-02-23 PROCEDURE — 96375 TX/PRO/DX INJ NEW DRUG ADDON: CPT

## 2020-02-23 PROCEDURE — 82805 BLOOD GASES W/O2 SATURATION: CPT

## 2020-02-23 PROCEDURE — 93971 EXTREMITY STUDY: CPT

## 2020-02-23 PROCEDURE — 85610 PROTHROMBIN TIME: CPT

## 2020-02-23 PROCEDURE — 71275 CT ANGIOGRAPHY CHEST: CPT

## 2020-02-23 PROCEDURE — 85379 FIBRIN DEGRADATION QUANT: CPT

## 2020-02-23 PROCEDURE — 85027 COMPLETE CBC AUTOMATED: CPT

## 2020-02-23 PROCEDURE — 83735 ASSAY OF MAGNESIUM: CPT

## 2020-02-23 PROCEDURE — 99285 EMERGENCY DEPT VISIT HI MDM: CPT

## 2020-02-23 PROCEDURE — 84484 ASSAY OF TROPONIN QUANT: CPT

## 2020-02-23 PROCEDURE — 80053 COMPREHEN METABOLIC PANEL: CPT

## 2020-02-23 PROCEDURE — 93005 ELECTROCARDIOGRAM TRACING: CPT

## 2020-02-23 PROCEDURE — 84702 CHORIONIC GONADOTROPIN TEST: CPT

## 2020-02-23 PROCEDURE — 82550 ASSAY OF CK (CPK): CPT

## 2020-02-23 PROCEDURE — 71045 X-RAY EXAM CHEST 1 VIEW: CPT

## 2020-02-23 PROCEDURE — 36415 COLL VENOUS BLD VENIPUNCTURE: CPT

## 2020-02-23 PROCEDURE — 93010 ELECTROCARDIOGRAM REPORT: CPT

## 2020-02-23 NOTE — EMERGENCY DEPARTMENT REPORT
ED General Adult HPI





- General


Chief complaint: Chest Pain


Stated complaint: CP/HYPERGLYCEMIA


Time Seen by Provider: 20 22:27


Source: patient, EMS ( EMS documentation not available at time of chart 

dictation ), RN notes reviewed, old records reviewed


Mode of arrival: Stretcher


Limitations: No Limitations





- History of Present Illness


Initial comments: 





During the entire history and physical examination, I am chaperone and escorted 

by paramedic CLAUDETTE Ely





This is a 44-year-old female.  I have evaluated this patient in the past.  Her 

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 

type 2 diabetes, hypertension, GERD, high cholesterol, left above-knee 

amputation, currently on chronic systemic anticoagulation, Xarelto, 10 mg.  It 

was prescribed by a local physician within the past 30 days, but she indicates 

she does not have a local primary care doctor, cardiologist.  She has not 

followed up with her vascular surgeon in over a year.  She has not followed up 

with a hematologist.





She is brought to the hospital by emergency medical services with a complaint of

chest pain.  The chest pain is central and left-sided.  It has been present for 

approximately 11.5 hours.  It is pleuritic in nature and does not radiate 

anywhere.  No fever, no exertional shortness of breath, no nausea or vomiting, 

no diaphoresis.  Positive recent aspirin consumption within the past 7 days.  

She also endorses right medial thigh cramping and discomfort.  No recent travel,

surgeries or immobilizations.  She states she is motivated to take care of her 

health, and she stopped smoking 10 days ago.  No recent cardiac stress test s

delaney 2018


-: Gradual, hour(s)


Location: chest, right, lower extremity


Quality: aching


Consistency: intermittent


Improves with: rest


Worsens with: movement





- Related Data


                                  Previous Rx's











 Medication  Instructions  Recorded  Last Taken  Type


 


AtorvaSTATin [Lipitor] 40 mg PO QHS #30 tablet 18 Unknown Rx


 


Ferrous Sulfate [Iron 325 MG] 325 mg PO TID #90 tablet 18 Unknown Rx


 


Pantoprazole [Protonix TAB] 40 mg PO QDAY #30 tablet 18 Unknown Rx


 


Pregabalin 150 mg PO QDAY #30 capsule 18 Unknown Rx


 


carvediloL [Coreg] 3.125 mg PO BID #60 tablet 18 Unknown Rx


 


lisinopriL [Zestril TAB] 40 mg PO QDAY #30 tablet 18 Unknown Rx


 


Insulin Aspart Protam & Aspart 35 unit SQ BID 30 Days  ml 18 Unknown Rx





[NovoLOG Mix 70-30 Flexpen]    


 


hydrALAZINE [Apresoline TAB] 100 mg PO TID #90 tab 18 Unknown Rx


 


HYDROcodone/APAP 5-325 [[place] 1 each PO Q6HR PRN #20 tablet 18 Unknown Rx





5/325]    


 


Metaxalone [Skelaxin] 800 mg PO TID PRN #15 tablet 03/15/19 Unknown Rx


 


oxyCODONE /ACETAMINOPHEN [Percocet 1 tab PO Q4HR PRN #10 tab 03/15/19 Unknown Rx





5/325]    


 


Famotidine [Pepcid] 20 mg PO QDAY #30 tablet 19 Unknown Rx


 


Rivaroxaban [Xarelto] 15 mg PO QDAY #30 tablet 19 Unknown Rx











                                    Allergies











Allergy/AdvReac Type Severity Reaction Status Date / Time


 


No Known Allergies Allergy   Verified 19 16:41














ED Review of Systems


ROS: 


Stated complaint: CP/HYPERGLYCEMIA


Other details as noted in HPI





Constitutional: denies: fever


Eyes: denies: eye discharge


Respiratory: denies: cough, SOB with exertion, SOB at rest


Cardiovascular: chest pain


Gastrointestinal: denies: abdominal pain, hematemesis, melena, hematochezia


Genitourinary: denies: dysuria


Musculoskeletal: arthralgia, myalgia


Skin: denies: lesions


Neurological: denies: weakness


Hematological/Lymphatic: denies: easy bleeding





ED Past Medical Hx





- Past Medical History


Hx Hypertension: Yes


Hx Congestive Heart Failure: No


Hx Diabetes: Yes


Hx Deep Vein Thrombosis: Yes


Hx Pulmonary Embolism: Yes (DVT; Takes Xarelto)


Hx GERD: Yes


Hx Arthritis: Yes


Hx Asthma: No


Hx COPD: No


Hx HIV: No


Additional medical history: high cholesterol





- Surgical History


Hx Cholecystectomy: Yes


Additional Surgical History: L shoulder sx , DVT removal left leg 3/2016.  c

 sec.  left BKA 





- Social History


Smoking Status: Current Some Day Smoker


Substance Use Type: None





- Medications


Home Medications: 


                                Home Medications











 Medication  Instructions  Recorded  Confirmed  Last Taken  Type


 


AtorvaSTATin [Lipitor] 40 mg PO QHS #30 tablet 18  Unknown Rx


 


Ferrous Sulfate [Iron 325 MG] 325 mg PO TID #90 tablet 18  Unknown Rx


 


Pantoprazole [Protonix TAB] 40 mg PO QDAY #30 tablet 18  Unknown Rx


 


Pregabalin 150 mg PO QDAY #30 capsule 18  Unknown Rx


 


carvediloL [Coreg] 3.125 mg PO BID #60 tablet 18  Unknown Rx


 


lisinopriL [Zestril TAB] 40 mg PO QDAY #30 tablet 18  Unknown Rx


 


Insulin Aspart Protam & Aspart 35 unit SQ BID 30 Days  ml 18  Unknown Rx





[NovoLOG Mix 70-30 Flexpen]     


 


hydrALAZINE [Apresoline TAB] 100 mg PO TID #90 tab 18  Unknown Rx


 


HYDROcodone/APAP 5-325 [[place] 1 each PO Q6HR PRN #20 tablet 18  Unknown Rx





5/325]     


 


Metaxalone [Skelaxin] 800 mg PO TID PRN #15 tablet 03/15/19  Unknown Rx


 


oxyCODONE /ACETAMINOPHEN [Percocet 1 tab PO Q4HR PRN #10 tab 03/15/19  Unknown 

Rx





5/325]     


 


Famotidine [Pepcid] 20 mg PO QDAY #30 tablet 19  Unknown Rx


 


Rivaroxaban [Xarelto] 15 mg PO QDAY #30 tablet 19  Unknown Rx














ED Physical Exam





- General


Limitations: Other (Chaperoned by paramedic Ely)


General appearance: alert, in no apparent distress, obese





- Head


Head exam: Present: atraumatic, normocephalic





- Eye


Eye exam: Present: normal appearance, EOMI.  Absent: nystagmus





- ENT


ENT exam: Present: normal exam, normal orophraynx, mucous membranes moist, 

normal external ear exam





- Neck


Neck exam: Present: normal inspection, full ROM.  Absent: tenderness, 

meningismus





- Respiratory


Respiratory exam: Present: normal lung sounds bilaterally, chest wall 

tenderness, other (There is no redness, pus or streaking).  Absent: respiratory 

distress, wheezes, rales, rhonchi, stridor





- Cardiovascular


Cardiovascular Exam: Present: regular rate, normal rhythm, normal heart sounds. 

 Absent: bradycardia, tachycardia, irregular rhythm, systolic murmur, diastolic 

murmur, rubs, gallop





- GI/Abdominal


GI/Abdominal exam: Present: soft.  Absent: distended, tenderness, guarding, 

rebound, rigid, pulsatile mass





- Extremities Exam


Extremities exam: Present: normal inspection, full ROM, other (2+ pulses noted 

in the bilateral upper extremities and right lower extremity.  There is no right

 lower extremity tenderness.  There is no palpable cord or Homans sign.  The 

muscular compartments are soft.  Left lower extremity is status post above-knee 

amputation, prosthesis is noted).  Absent: pedal edema, calf tenderness





- Back Exam


Back exam: Present: normal inspection, full ROM.  Absent: tenderness, CVA 

tenderness (R), CVA tenderness (L), paraspinal tenderness, vertebral tenderness





- Neurological Exam


Neurological exam: Present: alert, other (There is no facial droop.  The tongue 

is midline.  Extraocular movements are intact bilaterally.  There is 5 out of 5 

strength in bilateral upper and lower extremities.  Sensation is intact to light

 touch bilateral upper and lower extremities. ).  Absent: motor sensory deficit





- Psychiatric


Psychiatric exam: Present: normal mood, anxious





- Skin


Skin exam: Present: warm, dry, intact, normal color.  Absent: rash





ED Course


                                   Vital Signs











  20





  22:32 01:27


 


Temperature 97.9 F 98.1 F


 


Pulse Rate 88 82


 


Respiratory 16 13





Rate  


 


Blood Pressure 181/94 


 


Blood Pressure  184/98





[Left]  


 


O2 Sat by Pulse 100 98





Oximetry  














- Reevaluation(s)


Reevaluation #1: 





20 23:44


Differential diagnosis, including but not limited to: Costochondritis, pleurisy,

 pneumonia, pulmonary embolism, pericarditis, myocarditis, acute coronary 

syndrome, pneumothorax, right lower extremity sprain, strain, DVT, myositis





Assessment and plan: 44-year-old female with 2 complaints





Complaints #1, right-sided chest wall pain, intermittent for approximately 12 

hours that is reproducible.  She is afebrile with reassuring vital signs, 

hypertension chronic, EKG unchanged from prior, her cardiovascular risk factor 

profile is reviewed and appreciated, however based off of her history and 

physical, and given that the patient should be able to obtain close outpatient 

cardiology follow-up with 1 of our local cardiology practices, as per the 

current policy and procedures of this institution, for the purposes of ACS or 

stratification, will perform EKG x2, and troponin x2.  We will reassess after 

initial data points.





Screening laboratory studies sent, we will obtain CT scan of the chest.





Complaint #2, right lower extremity pain.  Her exam is unremarkable.  We will 

treat her pain, send appropriate laboratory studies, and obtain right lower 

extremity DVT study.


Reevaluation #2: 





20 01:10


X-ray of the chest negative for acute disease.  CT scan of the chest is negative

 for acute disease.  Repeat Accu-Chek, vital signs, troponin and EKG pending.


Reevaluation #3: 





20 02:02


Feeling improved.  Blood pressure 167/70.  Troponin negative x2.  EKG unchanged 

x2.  Objective testing negative for acute pathology.  Patient's paperwork faxed 

to [facility] cardiology as per this institutions policy and procedures, we 

discussed patient's labs, physical, and findings with the patient, instructed 

her on need to remain compliant with medications, and follow-up as an 

outpatient.  Return precautions are reviewed.  Most likely diagnosis is pleurisy

 versus costochondritis





ED Medical Decision Making





- Lab Data


Result diagrams: 


                                 20 23:05





                                 20 23:05








                                   Vital Signs











  20





  22:32


 


Temperature 97.9 F


 


Pulse Rate 88


 


Respiratory 16





Rate 


 


Blood Pressure 181/94


 


O2 Sat by Pulse 100





Oximetry 











Labs











  20





  23:05 23:05 23:05


 


WBC  7.1  


 


RBC  4.80  


 


Hgb  14.0  


 


Hct  39.9  


 


MCV  83  


 


MCH  29  


 


MCHC  35 H  


 


RDW  13.3  


 


Plt Count  193  


 


PT   11.8 L 


 


INR   0.86 L 


 


D-Dimer   


 


VBG pH   


 


Sodium    136 L


 


Potassium    3.5 L


 


Chloride    97.0 L


 


Carbon Dioxide    26


 


Anion Gap    17


 


BUN    11


 


Creatinine    0.5 L


 


Estimated GFR    > 60


 


BUN/Creatinine Ratio    22


 


Glucose    417 H


 


Calcium    9.6


 


Magnesium    2.00


 


Total Bilirubin    0.20


 


AST    20


 


ALT    25


 


Alkaline Phosphatase    116


 


Total Creatine Kinase    72


 


Troponin T    < 0.010


 


Total Protein    6.7


 


Albumin    3.8 L


 


Albumin/Globulin Ratio    1.3


 


HCG, Quant   














  20





  23:05 23:05 00:10


 


WBC   


 


RBC   


 


Hgb   


 


Hct   


 


MCV   


 


MCH   


 


MCHC   


 


RDW   


 


Plt Count   


 


PT   


 


INR   


 


D-Dimer    283.9 H


 


VBG pH   7.443 H 


 


Sodium   


 


Potassium   


 


Chloride   


 


Carbon Dioxide   


 


Anion Gap   


 


BUN   


 


Creatinine   


 


Estimated GFR   


 


BUN/Creatinine Ratio   


 


Glucose   


 


Calcium   


 


Magnesium   


 


Total Bilirubin   


 


AST   


 


ALT   


 


Alkaline Phosphatase   


 


Total Creatine Kinase   


 


Troponin T   


 


Total Protein   


 


Albumin   


 


Albumin/Globulin Ratio   


 


HCG, Quant  1.44  














- EKG Data


-: EKG Interpreted by Me


EKG shows normal: sinus rhythm


Rate: normal





- EKG Data


When compared to previous EKG there are: no significant change





20 00:48


The EKG today is abnormal.  It is unchanged from prior EKG.





Sinus rhythm, 81 bpm, left axis deviation,  ms, left ventricular 

hypertrophy, left anterior fascicular block, poor R wave progression, QTC 

prolonged.  Unchanged from prior EKG from 3/20/2019.  Not a STEMI





- Radiology Data


Radiology results: report reviewed, image reviewed





Print Report











Referring Physician: ASIA SAAVEDRA 


 


Patient Name: GILBERTO ROY 


 


Patient ID: [id number] 


 


YOB: 1975 


 


Sex: Female 


 


Accession: [id number] 


 


Report Date: 2020 


 


Report Status: Finalized 


 


Findings


[facility] [address] [place] [address] 

Vascular Lab Report Signed Patient: GILBERTO ROY MR#:  [id number] : 

1975 Acct:[id number] Age/Sex: 44 / F ADM Date: 20 Loc: ED 

Attending Dr: Ordering Physician: ASIA SAAVEDRA MD Date of Service: 20

 Procedure(s): VL venous duplex LE RT Accession Number(s): [id number] cc: ASIA SAAVEDRA MD DUPLEX DOPPLER LOWER EXTREMITY VEINS, RIGHT INDICATION: rle pain 

cramping. TECHNIQUE: Duplex doppler imaging was performed through the veins of 

the right lower extremity using venous compression and other maneuvers. COMPARIS

ON: None available. FINDINGS: Common Femoral vein: Negative. Superficial Femoral

 vein: Negative. Popliteal vein: Negative. Calf veins: Negative. Additional 

findings: None. IMPRESSION: 1. No sonographic evidence for DVT in the right 

lower extremity. Signer Name: Shyam Rodriguez MD Signed: 2020 12:33 AM 

Workstation Name: [id number] Transcribed By: HEAVEN Dictated By: Shyam Rodriguez MD 

Electronically Authenticated By: Shyam Rodriguez MD Signed Date/Time: 20 DD/DT: 20   











Critical care attestation.: 


If time is entered above; I have spent that time in minutes in the direct care 

of this critically ill patient, excluding procedure time.








ED Disposition


Clinical Impression: 


 Chest wall pain, Right leg pain, Hyperglycemia





Disposition: - TO HOME OR SELFCARE


Is pt being admited?: No


Does the pt Need Aspirin: No


Condition: Stable


Additional Instructions: 


Do not take metformin medication for the next 2 days, if patient takes this 

medication.  Patient may take Tylenol over-the-counter, 650 mg by mouth, every 

4-6 hours as needed for pain.  Recommend patient follow-up with her primary care

 doctor or cardiologist within the next 3 to 5 days.  Patient may contact any of

 the listed local physicians on her discharge paperwork, to arrange close 

outpatient follow-up.  Avoid consumption of tobacco, alcohol, sugary foods, 

simple carbohydrates, please return to the emergency room right away with new, 

worsened or different symptoms, or symptoms not present on the initial emergency

 room evaluation.


Referrals: 


PRIMARY CARE,MD [Primary Care Provider] - 3-5 Days


JEFFREY PURVIS MD [Staff Physician] - 3-5 Days


WARD BURTON MD [Staff Physician] - 3-5 Days


ROLAND RAYGOZA MD [Staff Physician] - 3-5 Days

## 2020-02-24 VITALS — DIASTOLIC BLOOD PRESSURE: 105 MMHG | SYSTOLIC BLOOD PRESSURE: 156 MMHG

## 2020-02-24 LAB
ALBUMIN SERPL-MCNC: 3.8 G/DL (ref 3.9–5)
ALT SERPL-CCNC: 25 UNITS/L (ref 7–56)
BUN SERPL-MCNC: 11 MG/DL (ref 7–17)
BUN/CREAT SERPL: 22 %
CALCIUM SERPL-MCNC: 9.6 MG/DL (ref 8.4–10.2)
HEMOLYSIS INDEX: 2

## 2020-02-24 NOTE — VASCULAR LAB REPORT
DUPLEX DOPPLER LOWER EXTREMITY VEINS, RIGHT



INDICATION:

rle pain cramping.



TECHNIQUE:

Duplex doppler imaging was performed through the veins of the right lower extremity using venous comp
ression and other maneuvers.



COMPARISON: 

None available.



FINDINGS:

Common Femoral vein: Negative.

Superficial Femoral vein: Negative.

Popliteal vein: Negative.

Calf veins: Negative.



Additional findings: None.



IMPRESSION:

1. No sonographic evidence for DVT in the right lower extremity.



Signer Name: Shyam Rodriguez MD 

Signed: 2/24/2020 12:33 AM

 Workstation Name: [email or web address]

## 2020-02-24 NOTE — CAT SCAN REPORT
CTA CHEST WITH IV CONTRAST



INDICATION:

pleuritic cp, hx of dvt pe.



TECHNIQUE:

Axial CT images were obtained through the chest after injection of IV contrast. 3 plane MIP reconstru
ctions were produced. All CT scans at this location are performed using CT dose reduction for ALARA b
y means of automated exposure control. 



COMPARISON:

CT 3/20/2019



FINDINGS:

Pulmonary Arteries: No pulmonary emboli.

Thoracic Aorta: No acute abnormality.

Heart: Normal.

Lungs: No acute air space or interstitial disease. 

Pleura: No pleural effusion. No pneumothorax.

Lymph Nodes: No significant adenopathy.

Additional Findings: None.



Upper Abdomen: No acute findings.



Skeletal Structures: No significant osseous abnormality.



IMPRESSION:

1. No CT evidence for pulmonary embolism. 

2. No acute findings.



Signer Name: Shyam Rodriguez MD 

Signed: 2/24/2020 12:59 AM

 Workstation Name: [id number]

## 2020-02-24 NOTE — XRAY REPORT
CHEST 1 VIEW



INDICATION: 

cp.



COMPARISON: 

917 and 18.



FINDINGS:



Support devices: None.



Heart: Normal. 



Lungs/Pleura: No acute pulmonary or pleural findings.  







IMPRESSION:

1. No acute findings.



Signer Name: Shyam Rodriguez MD 

Signed: 2/24/2020 1:00 AM

 Workstation Name: [id number]

## 2020-08-05 NOTE — DISCHARGE SUMMARY
Left message for patient to return call.    Refill to be completed but patient is due for appt in Sept.   Providers





- Providers


Date of Admission: 


08/09/18 11:13





Date of discharge: 08/12/18


Attending physician: 


LEONIDAS FARRAR





 





08/09/18


Consult to Cardiac Rehabilitation [CONS] Routine 


   Reason For Exam: Phase I





08/09/18 11:24


Consult to Cardiology [CONS] Routine 


   Consulting Provider: WARD BURTON


   Reason For Exam: chest pain











Primary care physician: 


PRIMARY CARE MD








Hospitalization


Condition: Fair


Hospital course: 





Discharge Diagnosis:


Chest apin, ruled out ACS


DM type 2 with hyperglycemia


HTN, uncontrolled


PVD s/p left leg BKA


h/o DVT/PE on xarelto


hypokalemia, repleted








Disposition: DC/TX-06 HOME UNDER HOME HLTH


Time spent for discharge: 34 minutes





Core Measure Documentation





- Palliative Care


Palliative Care/ Comfort Measures: Not Applicable





- Core Measures


Any of the following diagnoses?: none





Exam





- Physical Exam


Narrative exam: 





General appearance: Present: no acute distress, obese





- EENT


Eyes: PERRL, EOM intact


ENT: hearing intact, clear oral mucosa


Ears: bilateral: normal





- Neck


Neck: supple, normal ROM





- Respiratory


Respiratory effort: normal


Respiratory: bilateral: CTA





- Cardiovascular


Rhythm: regular


Heart Sounds: Present: S1 & S2.  Absent: gallop, rub


Extremities: pulses intact, No edema, normal color, Full ROM





- Gastrointestinal


General gastrointestinal: Present: soft, non-tender, non-distended, normal 

bowel sounds





- Integumentary


Integumentary: clear, warm, dry





- Musculoskeletal


Musculoskeletal: 1, strength equal bilaterally





- Neurologic


Neurologic: moves all extremities





- Psychiatric


Psychiatric: memory intact, appropriate mood/affect, intact judgment & insight











- Constitutional


Vitals: 


 











Temp Pulse Resp BP Pulse Ox


 


 98.2 F   79   20   149/94   97 


 


 08/11/18 07:54  08/11/18 07:54  08/11/18 07:54  08/11/18 07:54  08/11/18 07:54














Plan


Activity: advance as tolerated


Follow up with: 


PRIMARY CARE,MD [Primary Care Provider] - 3-5 Days


Prescriptions: 


AtorvaSTATin [Lipitor] 40 mg PO QHS #30 tablet


Carvedilol [Coreg] 3.125 mg PO BID #60 tablet


Ferrous Sulfate [Iron] 325 mg PO TID #90 tablet


hydrALAZINE [Apresoline TAB] 100 mg PO TID #90 tab


Insulin Aspart Protam & Aspart [NovoLOG Mix 70-30 Flexpen] 35 unit SQ BID 30 

Days  ml


Lisinopril [Zestril TAB] 40 mg PO QDAY #30 tablet


Pantoprazole [Protonix TAB] 40 mg PO QDAY #30 tablet


Pregabalin [Lyrica] 150 mg PO QDAY #30 capsule


Rivaroxaban [Xarelto] 15 mg PO QDAY #30 tablet

## 2021-06-16 ENCOUNTER — HOSPITAL ENCOUNTER (EMERGENCY)
Dept: HOSPITAL 5 - ED | Age: 46
Discharge: LEFT BEFORE DISCHARGE | End: 2021-06-16
Payer: MEDICARE

## 2021-06-16 DIAGNOSIS — Z53.21: ICD-10-CM

## 2021-06-16 DIAGNOSIS — R73.9: Primary | ICD-10-CM
